# Patient Record
Sex: MALE | Race: WHITE | URBAN - METROPOLITAN AREA
[De-identification: names, ages, dates, MRNs, and addresses within clinical notes are randomized per-mention and may not be internally consistent; named-entity substitution may affect disease eponyms.]

---

## 2017-06-01 ENCOUNTER — OFFICE (OUTPATIENT)
Dept: URBAN - METROPOLITAN AREA CLINIC 45 | Facility: CLINIC | Age: 81
End: 2017-06-01

## 2017-06-01 VITALS
TEMPERATURE: 97.1 F | WEIGHT: 153 LBS | HEART RATE: 61 BPM | SYSTOLIC BLOOD PRESSURE: 130 MMHG | HEIGHT: 65 IN | DIASTOLIC BLOOD PRESSURE: 66 MMHG

## 2017-06-01 DIAGNOSIS — Z85.00 PERSONAL HISTORY COLON CANCER: ICD-10-CM

## 2017-06-01 DIAGNOSIS — D64.9 ANEMIA UNSPECIFIED: ICD-10-CM

## 2017-06-01 DIAGNOSIS — R63.4 UNEXPLAINED WEIGHT LOSS: ICD-10-CM

## 2017-06-01 PROCEDURE — 99204 OFFICE O/P NEW MOD 45 MIN: CPT

## 2017-06-01 NOTE — SERVICEHPINOTES
The patient is seen for evaluation of constipation.    Notes the onset of constipation   1 - 3  year   ago.  Symptoms started   intermittently  .   Currently, the patient evacuates stool   1   times per   day  .    The stools are   hard  .   They are typically   brown   in color.    Associated symptoms include   excessive straining during defecation and weight loss  .  Symptoms are alleviated with   fiber supplements  .  The patient has previously medicated symptoms with   fiber supplements  .   The patient has undergone a colonoscopy   3  years   ago.  Findings on that exam included   anastamosis  .

## 2017-08-22 ENCOUNTER — OFFICE (OUTPATIENT)
Dept: URBAN - METROPOLITAN AREA CLINIC 45 | Facility: CLINIC | Age: 81
End: 2017-08-22

## 2017-08-22 VITALS
WEIGHT: 158 LBS | SYSTOLIC BLOOD PRESSURE: 142 MMHG | DIASTOLIC BLOOD PRESSURE: 78 MMHG | HEIGHT: 65 IN | HEART RATE: 68 BPM | TEMPERATURE: 97.8 F

## 2017-08-22 DIAGNOSIS — Z85.00 PERSONAL HISTORY COLON CANCER: ICD-10-CM

## 2017-08-22 DIAGNOSIS — K43.2 INCISIONAL HERNIA: ICD-10-CM

## 2017-08-22 DIAGNOSIS — K59.01 CONSTIPATION, SLOW TRANSIT: ICD-10-CM

## 2017-08-22 DIAGNOSIS — K44.9 GASTROESOPHAGEAL REFLUX DISEASE WITH HIATAL HERNIA: ICD-10-CM

## 2017-08-22 PROCEDURE — 99214 OFFICE O/P EST MOD 30 MIN: CPT

## 2017-08-22 RX ORDER — ESOMEPRAZOLE MAGNESIUM 40 MG/1
40 CAPSULE, DELAYED RELEASE ORAL
Qty: 30 | Status: COMPLETED
Start: 2017-08-22 | End: 2018-01-24

## 2017-08-22 RX ORDER — METOCLOPRAMIDE 5 MG/1
15 TABLET ORAL
Qty: 90 | Status: COMPLETED
Start: 2017-08-22 | End: 2018-01-24

## 2017-08-22 NOTE — SERVICEHPINOTES
The patient is seen for evaluation of constipation. Notes the onset of constipation 1 - 3 year ago. Symptoms started intermittently. Currently, the patient evacuates stool 1 times per day. The stools are hard. They are typically brown in color. Associated symptoms include excessive straining during defecation and weight loss. Symptoms are alleviated with fiber supplements. The patient has previously medicated symptoms with fiber supplements. The patient has undergone a colonoscopy 3 years ago. Findings on that exam included anastamosis. Negative CT scan and chem panel 2 mo ago

## 2018-01-24 ENCOUNTER — OFFICE (OUTPATIENT)
Dept: URBAN - METROPOLITAN AREA CLINIC 45 | Facility: CLINIC | Age: 82
End: 2018-01-24

## 2018-01-24 VITALS
HEART RATE: 68 BPM | HEIGHT: 65 IN | WEIGHT: 150 LBS | TEMPERATURE: 97.7 F | DIASTOLIC BLOOD PRESSURE: 64 MMHG | SYSTOLIC BLOOD PRESSURE: 112 MMHG

## 2018-01-24 DIAGNOSIS — Z86.010 PERSONAL HISTORY COLON POLYPS: ICD-10-CM

## 2018-01-24 DIAGNOSIS — I10 HYPERTENSION: ICD-10-CM

## 2018-01-24 DIAGNOSIS — Z85.00 PERSONAL HISTORY COLON CANCER: ICD-10-CM

## 2018-01-24 DIAGNOSIS — K43.2 INCISIONAL HERNIA: ICD-10-CM

## 2018-01-24 PROCEDURE — 99214 OFFICE O/P EST MOD 30 MIN: CPT | Performed by: INTERNAL MEDICINE

## 2018-01-24 NOTE — SERVICEHPINOTES
Pt states black stool for approx. 1 week. Has irreg bms with occ constipation. Has hx of anemia on iron but stopped iron a few days ago. Denies any rectal bleeding, abd pain, n/v, fever or chills. Pt has lost a few #s recently he states because of depression. Denies any use of Nsaids or blood thinners. Denies any recent heartburn or dysphagia.   Pt has hx of colon cancer sp colon resection 2003. Last colonoscopy 2014, several adenomatous polyps.

## 2018-02-20 ENCOUNTER — HOSPITAL ENCOUNTER (EMERGENCY)
Dept: HOSPITAL 12 - ER | Age: 83
Discharge: HOME | End: 2018-02-20
Payer: MEDICARE

## 2018-02-20 VITALS — SYSTOLIC BLOOD PRESSURE: 146 MMHG | DIASTOLIC BLOOD PRESSURE: 75 MMHG

## 2018-02-20 VITALS — BODY MASS INDEX: 24.11 KG/M2 | WEIGHT: 150 LBS | HEIGHT: 66 IN
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
## 2018-02-20 DIAGNOSIS — Z88.5: ICD-10-CM
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
## 2018-02-20 DIAGNOSIS — M25.512: ICD-10-CM
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
## 2018-02-20 DIAGNOSIS — S43.102A: Primary | ICD-10-CM
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
## 2018-02-20 LAB
ALP SERPL-CCNC: 56 U/L (ref 50–136)
ALT SERPL W/O P-5'-P-CCNC: 32 U/L (ref 16–63)
AST SERPL-CCNC: 25 U/L (ref 15–37)
BASOPHILS # BLD AUTO: 0.1 K/UL (ref 0–8)
BASOPHILS NFR BLD AUTO: 0.6 % (ref 0–2)
BASOPHILS NFR BLD MANUAL: 1 % (ref 0–2)
BILIRUB DIRECT SERPL-MCNC: 0.3 MG/DL (ref 0–0.2)
BILIRUB SERPL-MCNC: 1.1 MG/DL (ref 0.2–1)
BUN SERPL-MCNC: 18 MG/DL (ref 7–18)
CHLORIDE SERPL-SCNC: 102 MMOL/L (ref 98–107)
CO2 SERPL-SCNC: 21 MMOL/L (ref 21–32)
CREAT SERPL-MCNC: 1 MG/DL (ref 0.6–1.3)
EOSINOPHIL # BLD AUTO: 0 K/UL (ref 0–0.7)
EOSINOPHIL NFR BLD AUTO: 0.4 % (ref 0–7)
GLUCOSE SERPL-MCNC: 82 MG/DL (ref 74–106)
HCT VFR BLD AUTO: 46.3 % (ref 36.7–47.1)
HGB BLD-MCNC: 15.9 G/DL (ref 12.5–16.3)
LYMPHOCYTES # BLD AUTO: 6.8 K/UL (ref 20–40)
LYMPHOCYTES NFR BLD AUTO: 55.4 % (ref 20.5–51.5)
LYMPHOCYTES NFR BLD MANUAL: 50 % (ref 20–40)
MCH RBC QN AUTO: 27.8 UUG (ref 23.8–33.4)
MCHC RBC AUTO-ENTMCNC: 34 G/DL (ref 32.5–36.3)
MCV RBC AUTO: 81 FL (ref 73–96.2)
MONOCYTES # BLD AUTO: 0.7 K/UL (ref 2–10)
MONOCYTES NFR BLD AUTO: 6 % (ref 0–11)
MONOCYTES NFR BLD MANUAL: 6 % (ref 2–10)
NEUTROPHILS # BLD AUTO: 4.6 K/UL (ref 1.8–8.9)
NEUTROPHILS NFR BLD AUTO: 37.6 % (ref 38.5–71.5)
NEUTS SEG NFR BLD MANUAL: 43 % (ref 42–75)
PLATELET # BLD AUTO: 178 K/UL (ref 152–348)
POTASSIUM SERPL-SCNC: 3.8 MMOL/L (ref 3.5–5.1)
RBC # BLD AUTO: 5.72 MIL/UL (ref 4.06–5.63)
WBC # BLD AUTO: 12.3 K/UL (ref 3.6–10.2)
WS STN SPEC: 7.5 G/DL (ref 6.4–8.2)

## 2018-02-20 PROCEDURE — A4663 DIALYSIS BLOOD PRESSURE CUFF: HCPCS

## 2018-02-28 ENCOUNTER — OFFICE (OUTPATIENT)
Dept: URBAN - METROPOLITAN AREA CLINIC 45 | Facility: CLINIC | Age: 82
End: 2018-02-28

## 2018-02-28 VITALS
WEIGHT: 137 LBS | TEMPERATURE: 98.6 F | HEART RATE: 77 BPM | HEIGHT: 65 IN | DIASTOLIC BLOOD PRESSURE: 75 MMHG | SYSTOLIC BLOOD PRESSURE: 139 MMHG

## 2018-02-28 DIAGNOSIS — R63.4 UNEXPLAINED WEIGHT LOSS: ICD-10-CM

## 2018-02-28 DIAGNOSIS — Z85.00 PERSONAL HISTORY COLON CANCER: ICD-10-CM

## 2018-02-28 DIAGNOSIS — K43.2 INCISIONAL HERNIA: ICD-10-CM

## 2018-02-28 DIAGNOSIS — K59.01 CONSTIPATION, SLOW TRANSIT: ICD-10-CM

## 2018-02-28 DIAGNOSIS — I10 HYPERTENSION: ICD-10-CM

## 2018-02-28 DIAGNOSIS — K44.9 GASTROESOPHAGEAL REFLUX DISEASE WITH HIATAL HERNIA: ICD-10-CM

## 2018-02-28 PROCEDURE — 99213 OFFICE O/P EST LOW 20 MIN: CPT | Performed by: INTERNAL MEDICINE

## 2018-02-28 NOTE — SERVICEHPINOTES
Was in ER last night for constipation and lower abd discomfort.    PALENCIA including KUB and CT scan wo evidence for bowel obstruction. Pt has very thi stool and poor appetite . Continues to lose weight. Has hx of colon ca and hx of SBO. Lost 13# sinceJan. Denies any recent blood in stool or black stool.

## 2020-08-03 ENCOUNTER — HOSPITAL ENCOUNTER (INPATIENT)
Dept: HOSPITAL 12 - ER | Age: 85
LOS: 3 days | Discharge: NURSING, REHAB OR LONG-TERM CARE | DRG: 871 | End: 2020-08-06
Payer: MEDICARE

## 2020-08-03 VITALS — HEIGHT: 65 IN | WEIGHT: 147 LBS | BODY MASS INDEX: 24.49 KG/M2

## 2020-08-03 VITALS — SYSTOLIC BLOOD PRESSURE: 112 MMHG | DIASTOLIC BLOOD PRESSURE: 51 MMHG

## 2020-08-03 DIAGNOSIS — F02.80: ICD-10-CM

## 2020-08-03 DIAGNOSIS — R74.0: ICD-10-CM

## 2020-08-03 DIAGNOSIS — G20: ICD-10-CM

## 2020-08-03 DIAGNOSIS — A41.9: Primary | ICD-10-CM

## 2020-08-03 DIAGNOSIS — N17.0: ICD-10-CM

## 2020-08-03 DIAGNOSIS — J98.11: ICD-10-CM

## 2020-08-03 DIAGNOSIS — Z85.038: ICD-10-CM

## 2020-08-03 DIAGNOSIS — M17.0: ICD-10-CM

## 2020-08-03 DIAGNOSIS — B96.5: ICD-10-CM

## 2020-08-03 DIAGNOSIS — N39.0: ICD-10-CM

## 2020-08-03 DIAGNOSIS — Z92.21: ICD-10-CM

## 2020-08-03 LAB
ALP SERPL-CCNC: 96 U/L (ref 50–136)
ALT SERPL W/O P-5'-P-CCNC: 79 U/L (ref 16–63)
AST SERPL-CCNC: 98 U/L (ref 15–37)
BASOPHILS # BLD AUTO: 0 K/UL (ref 0–8)
BASOPHILS NFR BLD AUTO: 0.4 % (ref 0–2)
BILIRUB SERPL-MCNC: 0.7 MG/DL (ref 0.2–1)
BUN SERPL-MCNC: 29 MG/DL (ref 7–18)
CHLORIDE SERPL-SCNC: 106 MMOL/L (ref 98–107)
CK SERPL-CCNC: 375 U/L (ref 39–308)
CO2 SERPL-SCNC: 25 MMOL/L (ref 21–32)
CREAT SERPL-MCNC: 1.2 MG/DL (ref 0.6–1.3)
EOSINOPHIL # BLD AUTO: 0 K/UL (ref 0–0.7)
EOSINOPHIL NFR BLD AUTO: 0.2 % (ref 0–7)
FERRITIN SERPL-MCNC: 557 NG/ML (ref 26–388)
GLUCOSE SERPL-MCNC: 182 MG/DL (ref 74–106)
HCT VFR BLD AUTO: 36.3 % (ref 36.7–47.1)
HGB BLD-MCNC: 12.1 G/DL (ref 12.5–16.3)
LDH SERPL L TO P-CCNC: 235 U/L (ref 85–227)
LYMPHOCYTES # BLD AUTO: 2.5 K/UL (ref 20–40)
LYMPHOCYTES NFR BLD AUTO: 25.1 % (ref 20.5–51.5)
MCH RBC QN AUTO: 27.3 UUG (ref 23.8–33.4)
MCHC RBC AUTO-ENTMCNC: 33 G/DL (ref 32.5–36.3)
MCV RBC AUTO: 82 FL (ref 73–96.2)
MONOCYTES # BLD AUTO: 0.8 K/UL (ref 2–10)
MONOCYTES NFR BLD AUTO: 8.3 % (ref 0–11)
NEUTROPHILS # BLD AUTO: 6.5 K/UL (ref 1.8–8.9)
NEUTROPHILS NFR BLD AUTO: 66 % (ref 38.5–71.5)
PLATELET # BLD AUTO: 110 K/UL (ref 152–348)
POTASSIUM SERPL-SCNC: 4.2 MMOL/L (ref 3.5–5.1)
RBC # BLD AUTO: 4.43 MIL/UL (ref 4.06–5.63)
WBC # BLD AUTO: 9.9 K/UL (ref 3.6–10.2)
WS STN SPEC: 6.7 G/DL (ref 6.4–8.2)

## 2020-08-03 PROCEDURE — C1758 CATHETER, URETERAL: HCPCS

## 2020-08-03 PROCEDURE — G0378 HOSPITAL OBSERVATION PER HR: HCPCS

## 2020-08-03 PROCEDURE — A9150 MISC/EXPER NON-PRESCRIPT DRU: HCPCS

## 2020-08-03 PROCEDURE — A4663 DIALYSIS BLOOD PRESSURE CUFF: HCPCS

## 2020-08-03 RX ADMIN — SODIUM CHLORIDE PRN MLS/HR: 0.9 INJECTION, SOLUTION INTRAVENOUS at 22:30

## 2020-08-03 RX ADMIN — ENOXAPARIN SODIUM SCH MG: 30 INJECTION SUBCUTANEOUS at 23:03

## 2020-08-03 NOTE — NUR
pt bib ra 83 from home co fever. pt speaking farsi only, poor historian. denies 
pain or sob. only co being cold. covid precautions implemented

## 2020-08-03 NOTE — NUR
PT admitted from ER via [facility] under the care of   with the dx of sepsis , pneumonia 
and r/o covid Skilled nursing assessment done.alert  with periods of confusion,able to 
follow simple commands, denies pain , no facial grimaces of discomfort, no s/s of distress 
noted. IV line on right forearm 20 g patent and intact, no s/s of infiltration. Skin issues 
and photos taken .Call light with in reach .Will continue  to monitor.

## 2020-08-03 NOTE — NUR
Pt. admitted to Tele Room 318  , under care of Dr. Cuello

Belongs List completed, All belongings with pt

no s/s of distress

respirations even and unlabored

transported pt via gurney 2 RNs

## 2020-08-04 VITALS — DIASTOLIC BLOOD PRESSURE: 58 MMHG | SYSTOLIC BLOOD PRESSURE: 100 MMHG

## 2020-08-04 VITALS — DIASTOLIC BLOOD PRESSURE: 46 MMHG | SYSTOLIC BLOOD PRESSURE: 105 MMHG

## 2020-08-04 VITALS — SYSTOLIC BLOOD PRESSURE: 109 MMHG | DIASTOLIC BLOOD PRESSURE: 56 MMHG

## 2020-08-04 VITALS — SYSTOLIC BLOOD PRESSURE: 144 MMHG | DIASTOLIC BLOOD PRESSURE: 55 MMHG

## 2020-08-04 VITALS — SYSTOLIC BLOOD PRESSURE: 94 MMHG | DIASTOLIC BLOOD PRESSURE: 52 MMHG

## 2020-08-04 LAB
APPEARANCE UR: (no result)
BASOPHILS # BLD AUTO: 0 K/UL (ref 0–8)
BASOPHILS NFR BLD AUTO: 0.2 % (ref 0–2)
BILIRUB UR QL STRIP: NEGATIVE
BUN SERPL-MCNC: 25 MG/DL (ref 7–18)
CHLORIDE SERPL-SCNC: 107 MMOL/L (ref 98–107)
CHOLEST SERPL-MCNC: 83 MG/DL (ref ?–200)
CO2 SERPL-SCNC: 28 MMOL/L (ref 21–32)
COLOR UR: (no result)
CREAT SERPL-MCNC: 1.1 MG/DL (ref 0.6–1.3)
EOSINOPHIL # BLD AUTO: 0 K/UL (ref 0–0.7)
EOSINOPHIL NFR BLD AUTO: 0.1 % (ref 0–7)
FERRITIN SERPL-MCNC: 545 NG/ML (ref 26–388)
GLUCOSE SERPL-MCNC: 95 MG/DL (ref 74–106)
GLUCOSE UR STRIP-MCNC: NEGATIVE MG/DL
HCT VFR BLD AUTO: 36.6 % (ref 36.7–47.1)
HDLC SERPL-MCNC: 41 MG/DL (ref 40–60)
HGB BLD-MCNC: 12.3 G/DL (ref 12.5–16.3)
HGB UR QL STRIP: (no result)
KETONES UR STRIP-MCNC: NEGATIVE MG/DL
LDH SERPL L TO P-CCNC: 246 U/L (ref 85–227)
LEUKOCYTE ESTERASE UR QL STRIP: (no result)
LYMPHOCYTES # BLD AUTO: 6.9 K/UL (ref 20–40)
LYMPHOCYTES NFR BLD AUTO: 47.4 % (ref 20.5–51.5)
MAGNESIUM SERPL-MCNC: 2 MG/DL (ref 1.8–2.4)
MCH RBC QN AUTO: 27.3 UUG (ref 23.8–33.4)
MCHC RBC AUTO-ENTMCNC: 34 G/DL (ref 32.5–36.3)
MCV RBC AUTO: 81.2 FL (ref 73–96.2)
MONOCYTES # BLD AUTO: 1 K/UL (ref 2–10)
MONOCYTES NFR BLD AUTO: 7 % (ref 0–11)
NEUTROPHILS # BLD AUTO: 6.6 K/UL (ref 1.8–8.9)
NEUTROPHILS NFR BLD AUTO: 45.3 % (ref 38.5–71.5)
NITRITE UR QL STRIP: POSITIVE
PH UR STRIP: 7 [PH] (ref 5–8)
PHOSPHATE SERPL-MCNC: 3.4 MG/DL (ref 2.5–4.9)
PLATELET # BLD AUTO: 107 K/UL (ref 152–348)
POTASSIUM SERPL-SCNC: 3.7 MMOL/L (ref 3.5–5.1)
RBC # BLD AUTO: 4.51 MIL/UL (ref 4.06–5.63)
RBC #/AREA URNS HPF: (no result) /HPF (ref 0–3)
SP GR UR STRIP: 1.02 (ref 1–1.03)
TRIGL SERPL-MCNC: 58 MG/DL (ref 30–150)
TSH SERPL DL<=0.005 MIU/L-ACNC: 0.78 MIU/ML (ref 0.36–3.74)
UROBILINOGEN UR STRIP-MCNC: 1 E.U./DL
WBC # BLD AUTO: 14.7 K/UL (ref 3.6–10.2)
WBC #/AREA URNS HPF: (no result) /HPF
WBC #/AREA URNS HPF: (no result) /HPF (ref 0–3)

## 2020-08-04 RX ADMIN — ZINC SULFATE CAP 220 MG (50 MG ELEMENTAL ZN) SCH MG: 220 (50 ZN) CAP at 08:25

## 2020-08-04 RX ADMIN — DONEPEZIL HYDROCHLORIDE SCH MG: 5 TABLET, FILM COATED ORAL at 20:16

## 2020-08-04 RX ADMIN — OXYCODONE HYDROCHLORIDE AND ACETAMINOPHEN SCH MG: 500 TABLET ORAL at 08:24

## 2020-08-04 RX ADMIN — CITALOPRAM HYDROBROMIDE SCH MG: 20 TABLET, FILM COATED ORAL at 08:23

## 2020-08-04 RX ADMIN — PANTOPRAZOLE SODIUM SCH MG: 40 TABLET, DELAYED RELEASE ORAL at 06:29

## 2020-08-04 RX ADMIN — ATORVASTATIN CALCIUM SCH MG: 10 TABLET, FILM COATED ORAL at 20:16

## 2020-08-04 RX ADMIN — ENOXAPARIN SODIUM SCH MG: 30 INJECTION SUBCUTANEOUS at 20:19

## 2020-08-04 RX ADMIN — QUETIAPINE SCH MG: 25 TABLET, FILM COATED ORAL at 20:16

## 2020-08-04 RX ADMIN — DEXTROSE SCH MLS/HR: 50 INJECTION, SOLUTION INTRAVENOUS at 20:16

## 2020-08-04 RX ADMIN — SODIUM CHLORIDE PRN MLS/HR: 0.9 INJECTION, SOLUTION INTRAVENOUS at 11:45

## 2020-08-04 RX ADMIN — VITAMIN D, TAB 1000IU (100/BT) SCH UNIT: 25 TAB at 08:23

## 2020-08-04 RX ADMIN — LOSARTAN POTASSIUM SCH MG: 50 TABLET, FILM COATED ORAL at 08:25

## 2020-08-04 NOTE — NUR
DR SOUSA HERE TO SEE PATIENT PER PATIENTS WIFE HE IS HAVING PAIN ON HIS LEFT AND RIGHT 
KNEES MD NOTIFIED WITH ORDER TO DO XRAYS OF BOTH KNEES AND NOTED

## 2020-08-04 NOTE — NUR
Patient slept throughout the night, no s/s of distress noted, afebrile, on tele monitor 
sinus rhythm , on IV fluid running at 75 cc/hr ,IV site on left upper arm .Call light within 
reach.Safety measure and isolation implemented.Will endorse to the oncoming shift.

## 2020-08-04 NOTE — NUR
RECEIVED IN BED PATIENT IS ALERT BUT HAS LANGUAGE ISSUES ALL NEEDS ANTICIPATED AND SATISFIED 
REMAIN ON IVF AS ORDERED WITH NO S/S OF INFILTERATION ON SITE.ATTEMPTS ARE BEING MADE TO 
COLLECT URINE AS ORDERED.CALL LIGHTS AND PERSONAL BELONGINGS ARE WITHIN EASY REACH MADE 
COMFORTABLE WILL CONTINUE TO OBSERVE

## 2020-08-04 NOTE — NUR
PER THE CHARGE RN PATIENT WILL BE SEEN BY THE INFECTIOUS DISEASE DR MANUEL WHO WILL THEN MAKE 
THE DECISION IF PATIENT CAN BE OFF ISOLATION

## 2020-08-04 NOTE — NUR
Received pt in bed, awake. Patient has no s/s of acute distress or pain at this time. V/S 
stable, on room air saturating WNL. IV on RFA is intact with NS running at 75cc. Safety 
measures in place. Call light within reach. Bed low and locked in position. Will continue 
with the plan of care.

## 2020-08-05 VITALS — SYSTOLIC BLOOD PRESSURE: 158 MMHG | DIASTOLIC BLOOD PRESSURE: 54 MMHG

## 2020-08-05 VITALS — SYSTOLIC BLOOD PRESSURE: 134 MMHG | DIASTOLIC BLOOD PRESSURE: 52 MMHG

## 2020-08-05 VITALS — SYSTOLIC BLOOD PRESSURE: 112 MMHG | DIASTOLIC BLOOD PRESSURE: 49 MMHG

## 2020-08-05 VITALS — SYSTOLIC BLOOD PRESSURE: 134 MMHG | DIASTOLIC BLOOD PRESSURE: 61 MMHG

## 2020-08-05 VITALS — DIASTOLIC BLOOD PRESSURE: 58 MMHG | SYSTOLIC BLOOD PRESSURE: 115 MMHG

## 2020-08-05 VITALS — SYSTOLIC BLOOD PRESSURE: 137 MMHG | DIASTOLIC BLOOD PRESSURE: 63 MMHG

## 2020-08-05 LAB
BASOPHILS # BLD AUTO: 0 K/UL (ref 0–8)
BASOPHILS NFR BLD AUTO: 0.4 % (ref 0–2)
BUN SERPL-MCNC: 21 MG/DL (ref 7–18)
CHLORIDE SERPL-SCNC: 108 MMOL/L (ref 98–107)
CO2 SERPL-SCNC: 23 MMOL/L (ref 21–32)
CREAT SERPL-MCNC: 0.9 MG/DL (ref 0.6–1.3)
EOSINOPHIL # BLD AUTO: 0.2 K/UL (ref 0–0.7)
EOSINOPHIL NFR BLD AUTO: 1.7 % (ref 0–7)
FERRITIN SERPL-MCNC: 561 NG/ML (ref 26–388)
GLUCOSE SERPL-MCNC: 94 MG/DL (ref 74–106)
HCT VFR BLD AUTO: 32.1 % (ref 36.7–47.1)
HGB BLD-MCNC: 10.9 G/DL (ref 12.5–16.3)
LDH SERPL L TO P-CCNC: 202 U/L (ref 85–227)
LYMPHOCYTES # BLD AUTO: 5.4 K/UL (ref 20–40)
LYMPHOCYTES NFR BLD AUTO: 51.6 % (ref 20.5–51.5)
MAGNESIUM SERPL-MCNC: 1.9 MG/DL (ref 1.8–2.4)
MCH RBC QN AUTO: 27.5 UUG (ref 23.8–33.4)
MCHC RBC AUTO-ENTMCNC: 34 G/DL (ref 32.5–36.3)
MCV RBC AUTO: 81 FL (ref 73–96.2)
MONOCYTES # BLD AUTO: 0.7 K/UL (ref 2–10)
MONOCYTES NFR BLD AUTO: 7 % (ref 0–11)
NEUTROPHILS # BLD AUTO: 4.1 K/UL (ref 1.8–8.9)
NEUTROPHILS NFR BLD AUTO: 39.3 % (ref 38.5–71.5)
PHOSPHATE SERPL-MCNC: 3 MG/DL (ref 2.5–4.9)
PLATELET # BLD AUTO: 111 K/UL (ref 152–348)
POTASSIUM SERPL-SCNC: 3.9 MMOL/L (ref 3.5–5.1)
RBC # BLD AUTO: 3.96 MIL/UL (ref 4.06–5.63)
WBC # BLD AUTO: 10.5 K/UL (ref 3.6–10.2)

## 2020-08-05 RX ADMIN — PANTOPRAZOLE SODIUM SCH MG: 40 TABLET, DELAYED RELEASE ORAL at 06:06

## 2020-08-05 RX ADMIN — DEXTROSE SCH MLS/HR: 50 INJECTION, SOLUTION INTRAVENOUS at 20:24

## 2020-08-05 RX ADMIN — LOSARTAN POTASSIUM SCH MG: 50 TABLET, FILM COATED ORAL at 08:57

## 2020-08-05 RX ADMIN — SODIUM CHLORIDE PRN MLS/HR: 0.9 INJECTION, SOLUTION INTRAVENOUS at 16:45

## 2020-08-05 RX ADMIN — DEXTROSE SCH MLS/HR: 50 INJECTION, SOLUTION INTRAVENOUS at 08:13

## 2020-08-05 RX ADMIN — SODIUM CHLORIDE PRN MLS/HR: 0.9 INJECTION, SOLUTION INTRAVENOUS at 03:07

## 2020-08-05 RX ADMIN — DONEPEZIL HYDROCHLORIDE SCH MG: 5 TABLET, FILM COATED ORAL at 20:24

## 2020-08-05 RX ADMIN — ENOXAPARIN SODIUM SCH MG: 30 INJECTION SUBCUTANEOUS at 21:19

## 2020-08-05 RX ADMIN — VITAMIN D, TAB 1000IU (100/BT) SCH UNIT: 25 TAB at 08:55

## 2020-08-05 RX ADMIN — ZINC SULFATE CAP 220 MG (50 MG ELEMENTAL ZN) SCH MG: 220 (50 ZN) CAP at 08:56

## 2020-08-05 RX ADMIN — QUETIAPINE SCH MG: 25 TABLET, FILM COATED ORAL at 20:28

## 2020-08-05 RX ADMIN — OXYCODONE HYDROCHLORIDE AND ACETAMINOPHEN SCH MG: 500 TABLET ORAL at 08:56

## 2020-08-05 RX ADMIN — CITALOPRAM HYDROBROMIDE SCH MG: 20 TABLET, FILM COATED ORAL at 08:56

## 2020-08-05 RX ADMIN — ATORVASTATIN CALCIUM SCH MG: 10 TABLET, FILM COATED ORAL at 20:24

## 2020-08-05 NOTE — NUR
noted with very yellow to light brown color of urine in catheter bag, PT eval done, patient 
ambulated with PT few steps.

## 2020-08-05 NOTE — NUR
Bladder scan shows 650ml of urine. Initiated insertion of 16 fr. vázquez catheter per MD 
order. Vázquez is patent and draining camryn color urine. Will continue to monitor

## 2020-08-05 NOTE — NUR
patient stayed in bed, PT eval in process, tolerated meals well, no sob, resp even 
nonlabored, no cough or congestion noted, saturating 98% at room air,on tele monitor, sinus 
rythm. skin warm and dry to touch, turned and repositioned every 2 hours, heels elevated. 
vázquez catheter in place, draining brown colored urine, on hydration fluids as ordered, no 
distress noted, continue to monitor,

-------------------------------------------------------------------------------

Addendum: 08/05/20 at 1454 by ROGER BONILLA RN, RN

-------------------------------------------------------------------------------

safety precautions are in place

## 2020-08-05 NOTE — NUR
Pt slept intermittently throughout the night. Patient is awake and has no s/s of acute 
distress or pain at this time.V/S stable on room air. NSR 69 on tele monitor. Patient on 
vázquez, draining well. Fall and aspiration precaution maintained. Comfort care and needs 
attended. Safety measures in place. Call light within reach. Will endorse to the oncoming 
nurse accordingly.

## 2020-08-05 NOTE — NUR
noted right hand with pitting edema +2, elevated as tolerated, will continue to monitor, and 
endorse accordingly, IV site to right forearm is intact, no signs and symptoms of 
infiltration noted. no redness, no swelling noted at IV site.

## 2020-08-06 ENCOUNTER — HOSPITAL ENCOUNTER (INPATIENT)
Dept: HOSPITAL 12 - REHABOV3 | Age: 85
LOS: 17 days | Discharge: NURSING, REHAB OR LONG-TERM CARE | DRG: 871 | End: 2020-08-23
Attending: PHYSICAL MEDICINE & REHABILITATION | Admitting: PHYSICAL MEDICINE & REHABILITATION
Payer: MEDICARE

## 2020-08-06 VITALS — SYSTOLIC BLOOD PRESSURE: 121 MMHG | DIASTOLIC BLOOD PRESSURE: 42 MMHG

## 2020-08-06 VITALS — HEIGHT: 65 IN | WEIGHT: 128.5 LBS | BODY MASS INDEX: 21.41 KG/M2

## 2020-08-06 VITALS — SYSTOLIC BLOOD PRESSURE: 114 MMHG | DIASTOLIC BLOOD PRESSURE: 58 MMHG

## 2020-08-06 VITALS — DIASTOLIC BLOOD PRESSURE: 56 MMHG | SYSTOLIC BLOOD PRESSURE: 122 MMHG

## 2020-08-06 VITALS — SYSTOLIC BLOOD PRESSURE: 135 MMHG | DIASTOLIC BLOOD PRESSURE: 62 MMHG

## 2020-08-06 VITALS — DIASTOLIC BLOOD PRESSURE: 50 MMHG | SYSTOLIC BLOOD PRESSURE: 113 MMHG

## 2020-08-06 DIAGNOSIS — D50.9: ICD-10-CM

## 2020-08-06 DIAGNOSIS — Z88.8: ICD-10-CM

## 2020-08-06 DIAGNOSIS — D68.59: ICD-10-CM

## 2020-08-06 DIAGNOSIS — F02.80: ICD-10-CM

## 2020-08-06 DIAGNOSIS — N13.8: ICD-10-CM

## 2020-08-06 DIAGNOSIS — J18.9: ICD-10-CM

## 2020-08-06 DIAGNOSIS — R53.81: ICD-10-CM

## 2020-08-06 DIAGNOSIS — M17.0: ICD-10-CM

## 2020-08-06 DIAGNOSIS — Z85.038: ICD-10-CM

## 2020-08-06 DIAGNOSIS — G31.83: ICD-10-CM

## 2020-08-06 DIAGNOSIS — G92: ICD-10-CM

## 2020-08-06 DIAGNOSIS — I10: ICD-10-CM

## 2020-08-06 DIAGNOSIS — B96.5: ICD-10-CM

## 2020-08-06 DIAGNOSIS — R53.1: ICD-10-CM

## 2020-08-06 DIAGNOSIS — A41.9: Primary | ICD-10-CM

## 2020-08-06 DIAGNOSIS — F39: ICD-10-CM

## 2020-08-06 DIAGNOSIS — N39.0: ICD-10-CM

## 2020-08-06 DIAGNOSIS — Z90.49: ICD-10-CM

## 2020-08-06 DIAGNOSIS — G47.00: ICD-10-CM

## 2020-08-06 DIAGNOSIS — N40.1: ICD-10-CM

## 2020-08-06 DIAGNOSIS — Z86.73: ICD-10-CM

## 2020-08-06 DIAGNOSIS — N17.0: ICD-10-CM

## 2020-08-06 DIAGNOSIS — Z92.21: ICD-10-CM

## 2020-08-06 DIAGNOSIS — R41.89: ICD-10-CM

## 2020-08-06 DIAGNOSIS — G89.29: ICD-10-CM

## 2020-08-06 DIAGNOSIS — E44.0: ICD-10-CM

## 2020-08-06 DIAGNOSIS — F41.1: ICD-10-CM

## 2020-08-06 DIAGNOSIS — F03.90: ICD-10-CM

## 2020-08-06 DIAGNOSIS — Z88.6: ICD-10-CM

## 2020-08-06 DIAGNOSIS — K21.9: ICD-10-CM

## 2020-08-06 DIAGNOSIS — R33.8: ICD-10-CM

## 2020-08-06 LAB
BASOPHILS # BLD AUTO: 0 K/UL (ref 0–8)
BASOPHILS NFR BLD AUTO: 0.3 % (ref 0–2)
BUN SERPL-MCNC: 18 MG/DL (ref 7–18)
CHLORIDE SERPL-SCNC: 108 MMOL/L (ref 98–107)
CO2 SERPL-SCNC: 26 MMOL/L (ref 21–32)
CREAT SERPL-MCNC: 0.9 MG/DL (ref 0.6–1.3)
EOSINOPHIL # BLD AUTO: 0.3 K/UL (ref 0–0.7)
EOSINOPHIL NFR BLD AUTO: 2.5 % (ref 0–7)
FERRITIN SERPL-MCNC: 581 NG/ML (ref 26–388)
GLUCOSE SERPL-MCNC: 93 MG/DL (ref 74–106)
HCT VFR BLD AUTO: 34.1 % (ref 36.7–47.1)
HGB BLD-MCNC: 11.5 G/DL (ref 12.5–16.3)
LYMPHOCYTES # BLD AUTO: 5.8 K/UL (ref 20–40)
LYMPHOCYTES NFR BLD AUTO: 53.6 % (ref 20.5–51.5)
MAGNESIUM SERPL-MCNC: 1.9 MG/DL (ref 1.8–2.4)
MCH RBC QN AUTO: 27.5 UUG (ref 23.8–33.4)
MCHC RBC AUTO-ENTMCNC: 34 G/DL (ref 32.5–36.3)
MCV RBC AUTO: 81.5 FL (ref 73–96.2)
MONOCYTES # BLD AUTO: 0.8 K/UL (ref 2–10)
MONOCYTES NFR BLD AUTO: 7.1 % (ref 0–11)
NEUTROPHILS # BLD AUTO: 3.9 K/UL (ref 1.8–8.9)
NEUTROPHILS NFR BLD AUTO: 36.5 % (ref 38.5–71.5)
PHOSPHATE SERPL-MCNC: 3.3 MG/DL (ref 2.5–4.9)
PLATELET # BLD AUTO: 134 K/UL (ref 152–348)
POTASSIUM SERPL-SCNC: 4 MMOL/L (ref 3.5–5.1)
RBC # BLD AUTO: 4.18 MIL/UL (ref 4.06–5.63)
WBC # BLD AUTO: 10.8 K/UL (ref 3.6–10.2)

## 2020-08-06 PROCEDURE — A4217 STERILE WATER/SALINE, 500 ML: HCPCS

## 2020-08-06 PROCEDURE — A4663 DIALYSIS BLOOD PRESSURE CUFF: HCPCS

## 2020-08-06 PROCEDURE — C1758 CATHETER, URETERAL: HCPCS

## 2020-08-06 RX ADMIN — TAMSULOSIN HYDROCHLORIDE SCH MG: 0.4 CAPSULE ORAL at 20:11

## 2020-08-06 RX ADMIN — LOSARTAN POTASSIUM SCH MG: 50 TABLET, FILM COATED ORAL at 08:25

## 2020-08-06 RX ADMIN — ATORVASTATIN CALCIUM SCH MG: 10 TABLET, FILM COATED ORAL at 20:11

## 2020-08-06 RX ADMIN — PANTOPRAZOLE SODIUM SCH MG: 40 TABLET, DELAYED RELEASE ORAL at 06:07

## 2020-08-06 RX ADMIN — CITALOPRAM HYDROBROMIDE SCH MG: 20 TABLET, FILM COATED ORAL at 09:16

## 2020-08-06 RX ADMIN — DEXTROSE SCH MLS/HR: 50 INJECTION, SOLUTION INTRAVENOUS at 08:24

## 2020-08-06 RX ADMIN — ZINC SULFATE CAP 220 MG (50 MG ELEMENTAL ZN) SCH MG: 220 (50 ZN) CAP at 09:15

## 2020-08-06 RX ADMIN — HYDROCODONE BITARTRATE AND ACETAMINOPHEN PRN TAB: 5; 325 TABLET ORAL at 20:15

## 2020-08-06 RX ADMIN — OXYCODONE HYDROCHLORIDE AND ACETAMINOPHEN SCH MG: 500 TABLET ORAL at 08:24

## 2020-08-06 RX ADMIN — QUETIAPINE SCH MG: 25 TABLET, FILM COATED ORAL at 20:14

## 2020-08-06 RX ADMIN — VITAMIN D, TAB 1000IU (100/BT) SCH UNIT: 25 TAB at 09:15

## 2020-08-06 RX ADMIN — DONEPEZIL HYDROCHLORIDE SCH MG: 5 TABLET, FILM COATED ORAL at 20:11

## 2020-08-06 NOTE — NUR
PATIENT ALERT BUT FORGETFUL NO SOB NO CHEST PAIN, PATIENT ON TELE MONITOR SINUS RHYTHM SINUS 
KERMIT DEO 50'S, NO CATH PATENT DRAINING WITH YELLLOW COLOR URINE IN MODERATE AMOUNT, 
PATIENT HAS NO S/S OF PAIN AT THIS TIME, R ARM CONTINUE TO ELEVATE DUE SWELLING, PITTING 
EDEMA. CONT TO MONITOR.

## 2020-08-06 NOTE — NUR
Received pt resting in bed and talking on the phone with family. No acute distress noted. 
C/o 6/10 pain on bilateral knees and legs, PRN pain med and other due meds given as ordered. 
Meds given crushed with pudding, tolerated well. Snacks given, with assist feeding and 
drinking. Turned and repositioned. Both heels offloaded. Safety measures maintained. Call 
light and personal items within reach. Will continue to monitor.

## 2020-08-07 VITALS — DIASTOLIC BLOOD PRESSURE: 58 MMHG | SYSTOLIC BLOOD PRESSURE: 121 MMHG

## 2020-08-07 VITALS — SYSTOLIC BLOOD PRESSURE: 117 MMHG | DIASTOLIC BLOOD PRESSURE: 56 MMHG

## 2020-08-07 VITALS — DIASTOLIC BLOOD PRESSURE: 57 MMHG | SYSTOLIC BLOOD PRESSURE: 131 MMHG

## 2020-08-07 VITALS — DIASTOLIC BLOOD PRESSURE: 71 MMHG | SYSTOLIC BLOOD PRESSURE: 129 MMHG

## 2020-08-07 RX ADMIN — DONEPEZIL HYDROCHLORIDE SCH MG: 5 TABLET, FILM COATED ORAL at 20:08

## 2020-08-07 RX ADMIN — LOSARTAN POTASSIUM SCH MG: 50 TABLET, FILM COATED ORAL at 09:20

## 2020-08-07 RX ADMIN — CARBIDOPA AND LEVODOPA SCH TAB: 25; 100 TABLET ORAL at 20:09

## 2020-08-07 RX ADMIN — TAMSULOSIN HYDROCHLORIDE SCH MG: 0.4 CAPSULE ORAL at 20:09

## 2020-08-07 RX ADMIN — ANALGESIC BALM SCH GM: 1.74; 4.06 OINTMENT TOPICAL at 22:00

## 2020-08-07 RX ADMIN — CITALOPRAM HYDROBROMIDE SCH MG: 20 TABLET, FILM COATED ORAL at 09:20

## 2020-08-07 RX ADMIN — ANALGESIC BALM SCH GM: 1.74; 4.06 OINTMENT TOPICAL at 20:00

## 2020-08-07 RX ADMIN — QUETIAPINE SCH MG: 25 TABLET, FILM COATED ORAL at 20:09

## 2020-08-07 RX ADMIN — PANTOPRAZOLE SODIUM SCH MG: 40 TABLET, DELAYED RELEASE ORAL at 06:13

## 2020-08-07 RX ADMIN — ATORVASTATIN CALCIUM SCH MG: 10 TABLET, FILM COATED ORAL at 20:08

## 2020-08-07 RX ADMIN — CARBIDOPA AND LEVODOPA SCH TAB: 25; 100 TABLET ORAL at 17:49

## 2020-08-07 NOTE — NUR
Received pt resting in bed. No acute distress noted. Denies pain/ discomfort. Due meds given 
as ordered. Meds given crushed with pudding, tolerated well. Snacks given, with assist 
feeding and drinking. Turned and repositioned. Both heels offloaded. Canada catheter in 
place, draining clear yellow colored urine. Safety measures maintained. Call light and 
personal items within reach. Will continue to monitor.

## 2020-08-07 NOTE — NUR
Per Dr. Galindo refer patient to neurology for hand tremors and was patient seen by Dr. Fox and MD put in new orders.

## 2020-08-07 NOTE — NUR
Patient is awake, alert, not in any form of distress, on room air. He denies any pain or 
discomfort at this time. Assisted patient with his feeding. Due medications administered 
crushed and tolerated well. Needs attended to promptly and met. Peripheral IV site 20 on the 
right wrist in place and patent with no signs of infection. Canada catheter intact, patent, 
draining clear yellow urine. Call light and frequently used items placed within patient's 
reach. Safety measures maintained.

## 2020-08-08 VITALS — DIASTOLIC BLOOD PRESSURE: 67 MMHG | SYSTOLIC BLOOD PRESSURE: 150 MMHG

## 2020-08-08 VITALS — DIASTOLIC BLOOD PRESSURE: 70 MMHG | SYSTOLIC BLOOD PRESSURE: 142 MMHG

## 2020-08-08 VITALS — SYSTOLIC BLOOD PRESSURE: 122 MMHG | DIASTOLIC BLOOD PRESSURE: 52 MMHG

## 2020-08-08 VITALS — SYSTOLIC BLOOD PRESSURE: 130 MMHG | DIASTOLIC BLOOD PRESSURE: 68 MMHG

## 2020-08-08 LAB
BASOPHILS # BLD AUTO: 0 K/UL (ref 0–8)
BASOPHILS NFR BLD AUTO: 0.3 % (ref 0–2)
BUN SERPL-MCNC: 22 MG/DL (ref 7–18)
CHLORIDE SERPL-SCNC: 106 MMOL/L (ref 98–107)
CO2 SERPL-SCNC: 26 MMOL/L (ref 21–32)
CREAT SERPL-MCNC: 0.8 MG/DL (ref 0.6–1.3)
EOSINOPHIL # BLD AUTO: 0.1 K/UL (ref 0–0.7)
EOSINOPHIL NFR BLD AUTO: 1 % (ref 0–7)
GLUCOSE SERPL-MCNC: 114 MG/DL (ref 74–106)
HCT VFR BLD AUTO: 32.4 % (ref 36.7–47.1)
HGB BLD-MCNC: 11 G/DL (ref 12.5–16.3)
LYMPHOCYTES # BLD AUTO: 4.9 K/UL (ref 20–40)
LYMPHOCYTES NFR BLD AUTO: 42 % (ref 20.5–51.5)
MCH RBC QN AUTO: 27.4 UUG (ref 23.8–33.4)
MCHC RBC AUTO-ENTMCNC: 34 G/DL (ref 32.5–36.3)
MCV RBC AUTO: 80.9 FL (ref 73–96.2)
MONOCYTES # BLD AUTO: 1.1 K/UL (ref 2–10)
MONOCYTES NFR BLD AUTO: 9.4 % (ref 0–11)
NEUTROPHILS # BLD AUTO: 5.5 K/UL (ref 1.8–8.9)
NEUTROPHILS NFR BLD AUTO: 47.3 % (ref 38.5–71.5)
PLATELET # BLD AUTO: 170 K/UL (ref 152–348)
POTASSIUM SERPL-SCNC: 4.1 MMOL/L (ref 3.5–5.1)
RBC # BLD AUTO: 4 MIL/UL (ref 4.06–5.63)
WBC # BLD AUTO: 11.7 K/UL (ref 3.6–10.2)

## 2020-08-08 RX ADMIN — CARBIDOPA AND LEVODOPA SCH TAB: 25; 100 TABLET ORAL at 08:52

## 2020-08-08 RX ADMIN — CARBIDOPA AND LEVODOPA SCH TAB: 25; 100 TABLET ORAL at 17:34

## 2020-08-08 RX ADMIN — CITALOPRAM HYDROBROMIDE SCH MG: 20 TABLET, FILM COATED ORAL at 08:52

## 2020-08-08 RX ADMIN — MAGNESIUM HYDROXIDE PRN ML: 400 SUSPENSION ORAL at 14:51

## 2020-08-08 RX ADMIN — HYDROCODONE BITARTRATE AND ACETAMINOPHEN PRN TAB: 5; 325 TABLET ORAL at 04:43

## 2020-08-08 RX ADMIN — PANTOPRAZOLE SODIUM SCH MG: 40 TABLET, DELAYED RELEASE ORAL at 06:04

## 2020-08-08 RX ADMIN — ANALGESIC BALM SCH GM: 1.74; 4.06 OINTMENT TOPICAL at 13:10

## 2020-08-08 RX ADMIN — ATORVASTATIN CALCIUM SCH MG: 10 TABLET, FILM COATED ORAL at 20:52

## 2020-08-08 RX ADMIN — TAMSULOSIN HYDROCHLORIDE SCH MG: 0.4 CAPSULE ORAL at 20:53

## 2020-08-08 RX ADMIN — CARBIDOPA AND LEVODOPA SCH TAB: 25; 100 TABLET ORAL at 13:09

## 2020-08-08 RX ADMIN — DONEPEZIL HYDROCHLORIDE SCH MG: 5 TABLET, FILM COATED ORAL at 20:52

## 2020-08-08 RX ADMIN — CARBIDOPA AND LEVODOPA SCH TAB: 25; 100 TABLET ORAL at 20:53

## 2020-08-08 RX ADMIN — LOSARTAN POTASSIUM SCH MG: 50 TABLET, FILM COATED ORAL at 08:52

## 2020-08-08 RX ADMIN — ANALGESIC BALM SCH GM: 1.74; 4.06 OINTMENT TOPICAL at 21:04

## 2020-08-08 RX ADMIN — ANALGESIC BALM SCH GM: 1.74; 4.06 OINTMENT TOPICAL at 05:43

## 2020-08-08 RX ADMIN — QUETIAPINE SCH MG: 25 TABLET, FILM COATED ORAL at 20:53

## 2020-08-08 RX ADMIN — HYDROCODONE BITARTRATE AND ACETAMINOPHEN PRN TAB: 5; 325 TABLET ORAL at 09:18

## 2020-08-08 NOTE — NUR
Canada catheter removed, pt tolerated well. Pt's temperature 99.6 F, cooling measures 
provided, given bed bath. Norco PRN given for pain. Will endorse accordingly to oncoming 
shift. Continue to monitor.

## 2020-08-08 NOTE — NUR
Received patient awake in bed in stable condition. Patient seen and examined by MD Galindo. 
Patient left knee joint fluid remove by MD Galindo with 12ml yellowish output-sent to lab 
for analysis and culture. Awaiting result. Patient discontinue vázquez catheter as ordered, 
catheter remove at 6am as per endorse. Patient continue ATB levaquin 500mg every 24hours for 
UTI. Patient no urine output until 1pm. Bladder scan- 362ml, straight catheter done with 
620ml urine, yellowish with red color output. no foul smell noted. will continue monitor

## 2020-08-08 NOTE — NUR
Received patient in bed, head of bed elevated at 45 degrees. In no respiratory distress. No 
facial grimacing observed. Patient still with no urinary output since previous shift. 
Bladder scan done- 377mL. Straight catheter done per MD order, obtained 400mL of urine, 
camryn with few blood clots. Patient tolerated procedure well. Will continue to observe 
patient and monitor urinary output. Will continue to observe fall and safety precautions. 
Will continue to attend and anticipate needs.

## 2020-08-09 VITALS — SYSTOLIC BLOOD PRESSURE: 125 MMHG | DIASTOLIC BLOOD PRESSURE: 68 MMHG

## 2020-08-09 VITALS — DIASTOLIC BLOOD PRESSURE: 77 MMHG | SYSTOLIC BLOOD PRESSURE: 130 MMHG

## 2020-08-09 VITALS — DIASTOLIC BLOOD PRESSURE: 71 MMHG | SYSTOLIC BLOOD PRESSURE: 136 MMHG

## 2020-08-09 VITALS — DIASTOLIC BLOOD PRESSURE: 79 MMHG | SYSTOLIC BLOOD PRESSURE: 129 MMHG

## 2020-08-09 RX ADMIN — HYDROCODONE BITARTRATE AND ACETAMINOPHEN PRN TAB: 5; 325 TABLET ORAL at 21:03

## 2020-08-09 RX ADMIN — QUETIAPINE SCH MG: 25 TABLET, FILM COATED ORAL at 21:02

## 2020-08-09 RX ADMIN — AMANTADINE HYDROCHLORIDE SCH MG: 100 CAPSULE, GELATIN COATED ORAL at 10:20

## 2020-08-09 RX ADMIN — ANALGESIC BALM SCH GM: 1.74; 4.06 OINTMENT TOPICAL at 21:02

## 2020-08-09 RX ADMIN — LOSARTAN POTASSIUM SCH MG: 50 TABLET, FILM COATED ORAL at 09:09

## 2020-08-09 RX ADMIN — DONEPEZIL HYDROCHLORIDE SCH MG: 5 TABLET, FILM COATED ORAL at 21:01

## 2020-08-09 RX ADMIN — HYDROCODONE BITARTRATE AND ACETAMINOPHEN PRN TAB: 5; 325 TABLET ORAL at 09:11

## 2020-08-09 RX ADMIN — CARBIDOPA AND LEVODOPA SCH TAB: 25; 100 TABLET ORAL at 21:01

## 2020-08-09 RX ADMIN — ATORVASTATIN CALCIUM SCH MG: 10 TABLET, FILM COATED ORAL at 21:02

## 2020-08-09 RX ADMIN — PANTOPRAZOLE SODIUM SCH MG: 40 TABLET, DELAYED RELEASE ORAL at 06:27

## 2020-08-09 RX ADMIN — MAGNESIUM HYDROXIDE PRN ML: 400 SUSPENSION ORAL at 17:48

## 2020-08-09 RX ADMIN — AMANTADINE HYDROCHLORIDE SCH MG: 100 CAPSULE, GELATIN COATED ORAL at 21:02

## 2020-08-09 RX ADMIN — CARBIDOPA AND LEVODOPA SCH TAB: 25; 100 TABLET ORAL at 12:14

## 2020-08-09 RX ADMIN — ANALGESIC BALM SCH GM: 1.74; 4.06 OINTMENT TOPICAL at 14:36

## 2020-08-09 RX ADMIN — ANALGESIC BALM SCH GM: 1.74; 4.06 OINTMENT TOPICAL at 06:28

## 2020-08-09 RX ADMIN — CARBIDOPA AND LEVODOPA SCH TAB: 25; 100 TABLET ORAL at 09:08

## 2020-08-09 RX ADMIN — CARBIDOPA AND LEVODOPA SCH TAB: 25; 100 TABLET ORAL at 17:06

## 2020-08-09 NOTE — NUR
Patient noted with no BM, milk of magnesium administered as ordered, continue to monitor, 
will endorse accordingly

## 2020-08-09 NOTE — NUR
RECEIVED PT IN NO ACUTE DISTRESS WITH HEAD OF BED ELEVATED.  SAFETY AND COMFORT PROVIDED. 
WILL CONTINUE TO MONITOR.

## 2020-08-09 NOTE — NUR
Patient still with no urinary output. Bladder scan done- 384mL. Straight catheter done per 
MD order, obtained 610mL of urine, camryn with few blood clots. Patient tolerated procedure. 
Will endorse to AM nurse accordingly. All needs attended. Fall and safety precautions 
observed.

## 2020-08-09 NOTE — NUR
HANDS OFF REPORT TO VALENTINA TIMMONS. PT IN NO ACUTE DISTRESS. D/C BLADDER SCAN BECAUSE PT HAS 
NO CATHETER INSERTED. ORDERED BY ADIS GARRIDO. SAFETY AND COMFORT PROVIDED. PT IN STABLE 
CONDITION.

## 2020-08-09 NOTE — NUR
no distress noted during shift, PT, OT tolerated well, kept clean and dry, heels floated, 
repositioned every 2 hours while in bed to relieve the pressure.

## 2020-08-09 NOTE — NUR
Fluid analysis result of left knee aspiration relayed to Dr. Galindo with no new order. Also 
made aware that culture of fluid is still pending.

## 2020-08-10 VITALS — DIASTOLIC BLOOD PRESSURE: 68 MMHG | SYSTOLIC BLOOD PRESSURE: 134 MMHG

## 2020-08-10 VITALS — SYSTOLIC BLOOD PRESSURE: 135 MMHG | DIASTOLIC BLOOD PRESSURE: 71 MMHG

## 2020-08-10 VITALS — DIASTOLIC BLOOD PRESSURE: 68 MMHG | SYSTOLIC BLOOD PRESSURE: 132 MMHG

## 2020-08-10 VITALS — DIASTOLIC BLOOD PRESSURE: 55 MMHG | SYSTOLIC BLOOD PRESSURE: 124 MMHG

## 2020-08-10 VITALS — SYSTOLIC BLOOD PRESSURE: 129 MMHG | DIASTOLIC BLOOD PRESSURE: 58 MMHG

## 2020-08-10 RX ADMIN — ATORVASTATIN CALCIUM SCH MG: 10 TABLET, FILM COATED ORAL at 20:33

## 2020-08-10 RX ADMIN — AMANTADINE HYDROCHLORIDE SCH MG: 100 CAPSULE, GELATIN COATED ORAL at 20:33

## 2020-08-10 RX ADMIN — CARBIDOPA AND LEVODOPA SCH TAB: 25; 100 TABLET ORAL at 13:09

## 2020-08-10 RX ADMIN — LOSARTAN POTASSIUM SCH MG: 50 TABLET, FILM COATED ORAL at 08:24

## 2020-08-10 RX ADMIN — PANTOPRAZOLE SODIUM SCH MG: 40 TABLET, DELAYED RELEASE ORAL at 06:09

## 2020-08-10 RX ADMIN — ANALGESIC BALM SCH GM: 1.74; 4.06 OINTMENT TOPICAL at 23:07

## 2020-08-10 RX ADMIN — AMANTADINE HYDROCHLORIDE SCH MG: 100 CAPSULE, GELATIN COATED ORAL at 08:24

## 2020-08-10 RX ADMIN — CARBIDOPA AND LEVODOPA SCH TAB: 25; 100 TABLET ORAL at 08:24

## 2020-08-10 RX ADMIN — ANALGESIC BALM SCH GM: 1.74; 4.06 OINTMENT TOPICAL at 06:09

## 2020-08-10 RX ADMIN — CARBIDOPA AND LEVODOPA SCH TAB: 25; 100 TABLET ORAL at 17:08

## 2020-08-10 RX ADMIN — ANALGESIC BALM SCH GM: 1.74; 4.06 OINTMENT TOPICAL at 14:03

## 2020-08-10 RX ADMIN — QUETIAPINE SCH MG: 25 TABLET, FILM COATED ORAL at 20:33

## 2020-08-10 RX ADMIN — TAMSULOSIN HYDROCHLORIDE SCH MG: 0.4 CAPSULE ORAL at 20:33

## 2020-08-10 RX ADMIN — DONEPEZIL HYDROCHLORIDE SCH MG: 5 TABLET, FILM COATED ORAL at 20:37

## 2020-08-10 RX ADMIN — HYDROCODONE BITARTRATE AND ACETAMINOPHEN PRN TAB: 5; 325 TABLET ORAL at 09:00

## 2020-08-10 NOTE — NUR
PATIENT ALERT BUT FORGETFUL, NO S/S OF PAIN NOR DISCOMFORT. PATIENT NO CATH PATENT 
DRAINING WITH TEA COLOR URINE IN SMALL AMOUNT. PATIENT WAS KEPT CLEAN AND DRY, CALL LIGHT 
WITHIN REACH. CONT TO MONITOR.

## 2020-08-10 NOTE — NUR
Received patient awake in bed in stable condition. Patient continue with vázquez catheter for 
urine retention. Intact and patent, Iliana color and cloudy urine, no foul smell noted. 
Patient left knee swelling. Applied cold compress. Patient continue pain management prior to 
therapy with good effect. Patient continue monitoring by neurologist for tremors. Patient 
continue sinemet, no adverse reaction noted. will continue monitor

## 2020-08-10 NOTE — NUR
PATIENT AWAKE BUT FORGETFUL NO SOB NO CHEST PAIN. PATIENT HAS NO COMPLAIN OF PAIN AT THIS 
TIME. PATIENT NO CATH PATENT DRAINING WITH TEA COLOR URINE IN MODERATE AMOUNT. MD WAS 
AWARE OF TEA COLOR URINE WITH NO FURTHER ORDER. PATIENT KEPT CLEAN AND DRY, CONT TO MONITOR.

## 2020-08-10 NOTE — NUR
Patient seen and examined by MD Fox, order increase dose of sinemet 25-100mg in morning. 
will continue monitor

## 2020-08-10 NOTE — NUR
Received patient awake in bed. Patient continue on vázquez catheter with yellow color urine. 
Intact and patent. Patient continue therapy for increase strenght and therapeutic exercises. 
Patient continue pain management prior to therapy with good effect. Patient continue 
following by neurologist for tremors. Patient ongoing sinemet with fair effect. will 
continue monitor

## 2020-08-11 VITALS — SYSTOLIC BLOOD PRESSURE: 130 MMHG | DIASTOLIC BLOOD PRESSURE: 64 MMHG

## 2020-08-11 VITALS — DIASTOLIC BLOOD PRESSURE: 85 MMHG | SYSTOLIC BLOOD PRESSURE: 117 MMHG

## 2020-08-11 VITALS — DIASTOLIC BLOOD PRESSURE: 79 MMHG | SYSTOLIC BLOOD PRESSURE: 147 MMHG

## 2020-08-11 VITALS — DIASTOLIC BLOOD PRESSURE: 96 MMHG | SYSTOLIC BLOOD PRESSURE: 133 MMHG

## 2020-08-11 RX ADMIN — PANTOPRAZOLE SODIUM SCH MG: 40 TABLET, DELAYED RELEASE ORAL at 06:36

## 2020-08-11 RX ADMIN — ANALGESIC BALM SCH GM: 1.74; 4.06 OINTMENT TOPICAL at 05:03

## 2020-08-11 RX ADMIN — QUETIAPINE SCH MG: 25 TABLET, FILM COATED ORAL at 20:39

## 2020-08-11 RX ADMIN — HYDROCODONE BITARTRATE AND ACETAMINOPHEN PRN TAB: 5; 325 TABLET ORAL at 16:14

## 2020-08-11 RX ADMIN — CARBIDOPA AND LEVODOPA SCH TAB: 25; 100 TABLET ORAL at 10:06

## 2020-08-11 RX ADMIN — DONEPEZIL HYDROCHLORIDE SCH MG: 5 TABLET, FILM COATED ORAL at 20:39

## 2020-08-11 RX ADMIN — CARBIDOPA AND LEVODOPA SCH TAB: 25; 100 TABLET ORAL at 14:08

## 2020-08-11 RX ADMIN — LOSARTAN POTASSIUM SCH MG: 50 TABLET, FILM COATED ORAL at 08:19

## 2020-08-11 RX ADMIN — ANALGESIC BALM SCH GM: 1.74; 4.06 OINTMENT TOPICAL at 21:08

## 2020-08-11 RX ADMIN — AMANTADINE HYDROCHLORIDE SCH MG: 100 CAPSULE, GELATIN COATED ORAL at 20:39

## 2020-08-11 RX ADMIN — HYDROCODONE BITARTRATE AND ACETAMINOPHEN PRN TAB: 5; 325 TABLET ORAL at 08:48

## 2020-08-11 RX ADMIN — TAMSULOSIN HYDROCHLORIDE SCH MG: 0.4 CAPSULE ORAL at 20:39

## 2020-08-11 RX ADMIN — ANALGESIC BALM SCH GM: 1.74; 4.06 OINTMENT TOPICAL at 14:08

## 2020-08-11 RX ADMIN — CARBIDOPA AND LEVODOPA SCH TAB: 25; 100 TABLET ORAL at 17:52

## 2020-08-11 RX ADMIN — AMANTADINE HYDROCHLORIDE SCH MG: 100 CAPSULE, GELATIN COATED ORAL at 08:19

## 2020-08-11 NOTE — NUR
Received patient awake in bed in stable condition. Patient continue assisting in feeding for 
aspiration precaution. Patient continue therapy for increase participation in ADL and 
therapeutic exercises. Patient provided pain management prior to therapy. tolerated well. 
Patient continue Canada catheter for increase urinary retention. Intact and patent. not in 
distress. will continue monitor

## 2020-08-11 NOTE — NUR
PATIENT ASLEEP BUT EASILY AROUSABLE, NO COMPLAIN OF PAIN AT THIS TIME. PATIENT NO CATH 
PATENT DRAINING WITH YELLOW TO TEA COLOR URINE, TURN AND REPOSITION, KEPT COMFORTABLE. CONT 
TO MONITOR.

## 2020-08-11 NOTE — NUR
Patient seen and examined by MD Galindo, ketanol injection given for pain management by MD. 
Also, ordered MRI of brain without contrast for R/O CVA. Patient will transport to [facility] around 4pm. Patient seen and examined by MD Fox with order increase sinemet 
25-100mg 2 tabs at 10am, to start tomorrow. will continue monitor

## 2020-08-12 VITALS — DIASTOLIC BLOOD PRESSURE: 55 MMHG | SYSTOLIC BLOOD PRESSURE: 115 MMHG

## 2020-08-12 VITALS — SYSTOLIC BLOOD PRESSURE: 140 MMHG | DIASTOLIC BLOOD PRESSURE: 70 MMHG

## 2020-08-12 VITALS — SYSTOLIC BLOOD PRESSURE: 125 MMHG | DIASTOLIC BLOOD PRESSURE: 68 MMHG

## 2020-08-12 VITALS — DIASTOLIC BLOOD PRESSURE: 79 MMHG | SYSTOLIC BLOOD PRESSURE: 142 MMHG

## 2020-08-12 RX ADMIN — AMANTADINE HYDROCHLORIDE SCH MG: 100 CAPSULE, GELATIN COATED ORAL at 20:54

## 2020-08-12 RX ADMIN — CARBIDOPA AND LEVODOPA SCH TAB: 25; 100 TABLET ORAL at 18:18

## 2020-08-12 RX ADMIN — CARBIDOPA AND LEVODOPA SCH TAB: 25; 100 TABLET ORAL at 09:18

## 2020-08-12 RX ADMIN — PANTOPRAZOLE SODIUM SCH MG: 40 TABLET, DELAYED RELEASE ORAL at 06:09

## 2020-08-12 RX ADMIN — LOSARTAN POTASSIUM SCH MG: 50 TABLET, FILM COATED ORAL at 09:14

## 2020-08-12 RX ADMIN — AMANTADINE HYDROCHLORIDE SCH MG: 100 CAPSULE, GELATIN COATED ORAL at 09:14

## 2020-08-12 RX ADMIN — CARBIDOPA AND LEVODOPA SCH TAB: 25; 100 TABLET ORAL at 14:10

## 2020-08-12 RX ADMIN — TAMSULOSIN HYDROCHLORIDE SCH MG: 0.4 CAPSULE ORAL at 20:52

## 2020-08-12 RX ADMIN — DONEPEZIL HYDROCHLORIDE SCH MG: 5 TABLET, FILM COATED ORAL at 20:51

## 2020-08-12 RX ADMIN — QUETIAPINE SCH MG: 25 TABLET, FILM COATED ORAL at 20:52

## 2020-08-12 RX ADMIN — ANALGESIC BALM SCH GM: 1.74; 4.06 OINTMENT TOPICAL at 22:28

## 2020-08-12 RX ADMIN — ANALGESIC BALM SCH GM: 1.74; 4.06 OINTMENT TOPICAL at 16:05

## 2020-08-12 RX ADMIN — ANALGESIC BALM SCH GM: 1.74; 4.06 OINTMENT TOPICAL at 05:32

## 2020-08-12 RX ADMIN — CARBIDOPA AND LEVODOPA SCH TAB: 25; 100 TABLET ORAL at 05:31

## 2020-08-12 NOTE — NUR
Sleeping during initial rounds. No s/s of pain or discomforts noted. No s/s of respiratory 
distress. Safety measures and fall prevention maintained. Continue care as planned.

## 2020-08-12 NOTE — NUR
awake alert and oriented x2-3 . Farsi speaking but able to make needs known.

Tolerated po meds well. No nausea or vomiting noted. Denies any pain nor any

discomfort. VSS. PM care done. Canada catheter intact draining camryn urine. BM

noted this shift. Kept comfortable. Fall precautions maintained. Siderails up for

safety. Will monitor patient.

## 2020-08-12 NOTE — NUR
End of shift note: Slept well most of the shift. No acute distress

noted. Kept comfortable. No acute distress noted. Will monitor

patient. VSS.Canada catheter intact draining camryn urine.

## 2020-08-13 VITALS — DIASTOLIC BLOOD PRESSURE: 68 MMHG | SYSTOLIC BLOOD PRESSURE: 140 MMHG

## 2020-08-13 VITALS — SYSTOLIC BLOOD PRESSURE: 137 MMHG | DIASTOLIC BLOOD PRESSURE: 76 MMHG

## 2020-08-13 VITALS — SYSTOLIC BLOOD PRESSURE: 149 MMHG | DIASTOLIC BLOOD PRESSURE: 61 MMHG

## 2020-08-13 VITALS — SYSTOLIC BLOOD PRESSURE: 109 MMHG | DIASTOLIC BLOOD PRESSURE: 89 MMHG

## 2020-08-13 LAB
ALP SERPL-CCNC: 117 U/L (ref 50–136)
ALT SERPL W/O P-5'-P-CCNC: 57 U/L (ref 16–63)
APPEARANCE UR: CLEAR
AST SERPL-CCNC: 54 U/L (ref 15–37)
BASOPHILS # BLD AUTO: 0 K/UL (ref 0–8)
BASOPHILS NFR BLD AUTO: 0.1 % (ref 0–2)
BILIRUB SERPL-MCNC: 0.3 MG/DL (ref 0.2–1)
BILIRUB UR QL STRIP: NEGATIVE
BUN SERPL-MCNC: 24 MG/DL (ref 7–18)
CHLORIDE SERPL-SCNC: 106 MMOL/L (ref 98–107)
CO2 SERPL-SCNC: 24 MMOL/L (ref 21–32)
COLOR UR: YELLOW
CREAT SERPL-MCNC: 0.8 MG/DL (ref 0.6–1.3)
DEPRECATED SQUAMOUS URNS QL MICRO: (no result) /HPF
EOSINOPHIL # BLD AUTO: 0 K/UL (ref 0–0.7)
EOSINOPHIL NFR BLD AUTO: 0.1 % (ref 0–7)
GLUCOSE SERPL-MCNC: 93 MG/DL (ref 74–106)
GLUCOSE UR STRIP-MCNC: NEGATIVE MG/DL
HCT VFR BLD AUTO: 35.7 % (ref 36.7–47.1)
HGB BLD-MCNC: 11.6 G/DL (ref 12.5–16.3)
HGB UR QL STRIP: (no result)
IRON SERPL-MCNC: 58 UG/DL (ref 50–175)
KETONES UR STRIP-MCNC: NEGATIVE MG/DL
LEUKOCYTE ESTERASE UR QL STRIP: (no result)
LYMPHOCYTES # BLD AUTO: 4.7 K/UL (ref 20–40)
LYMPHOCYTES NFR BLD AUTO: 33.5 % (ref 20.5–51.5)
MAGNESIUM SERPL-MCNC: 2.3 MG/DL (ref 1.8–2.4)
MCH RBC QN AUTO: 26.5 UUG (ref 23.8–33.4)
MCHC RBC AUTO-ENTMCNC: 33 G/DL (ref 32.5–36.3)
MCV RBC AUTO: 81.4 FL (ref 73–96.2)
MONOCYTES # BLD AUTO: 0.9 K/UL (ref 2–10)
MONOCYTES NFR BLD AUTO: 6.3 % (ref 0–11)
NEUTROPHILS # BLD AUTO: 8.4 K/UL (ref 1.8–8.9)
NEUTROPHILS NFR BLD AUTO: 60 % (ref 38.5–71.5)
NITRITE UR QL STRIP: NEGATIVE
PH UR STRIP: >=9 [PH] (ref 5–8)
PHOSPHATE SERPL-MCNC: 3.7 MG/DL (ref 2.5–4.9)
PLATELET # BLD AUTO: 320 K/UL (ref 152–348)
POTASSIUM SERPL-SCNC: 4.2 MMOL/L (ref 3.5–5.1)
RBC # BLD AUTO: 4.39 MIL/UL (ref 4.06–5.63)
SP GR UR STRIP: 1.01 (ref 1–1.03)
UROBILINOGEN UR STRIP-MCNC: 0.2 E.U./DL
WBC # BLD AUTO: 13.9 K/UL (ref 3.6–10.2)
WBC #/AREA URNS HPF: (no result) /HPF
WBC #/AREA URNS HPF: (no result) /HPF (ref 0–3)
WS STN SPEC: 6.7 G/DL (ref 6.4–8.2)

## 2020-08-13 RX ADMIN — ANALGESIC BALM SCH APPLIC: 1.74; 4.06 OINTMENT TOPICAL at 14:47

## 2020-08-13 RX ADMIN — CARBIDOPA AND LEVODOPA SCH TAB: 25; 100 TABLET ORAL at 14:47

## 2020-08-13 RX ADMIN — AMANTADINE HYDROCHLORIDE SCH MG: 100 CAPSULE, GELATIN COATED ORAL at 08:56

## 2020-08-13 RX ADMIN — CARBIDOPA AND LEVODOPA SCH TAB: 25; 100 TABLET ORAL at 05:41

## 2020-08-13 RX ADMIN — QUETIAPINE SCH MG: 25 TABLET, FILM COATED ORAL at 20:32

## 2020-08-13 RX ADMIN — LOSARTAN POTASSIUM SCH MG: 50 TABLET, FILM COATED ORAL at 08:56

## 2020-08-13 RX ADMIN — CARBIDOPA AND LEVODOPA SCH TAB: 25; 100 TABLET ORAL at 09:11

## 2020-08-13 RX ADMIN — PANTOPRAZOLE SODIUM SCH MG: 40 TABLET, DELAYED RELEASE ORAL at 05:41

## 2020-08-13 RX ADMIN — HYDROCODONE BITARTRATE AND ACETAMINOPHEN PRN TAB: 5; 325 TABLET ORAL at 09:04

## 2020-08-13 RX ADMIN — ANALGESIC BALM SCH GM: 1.74; 4.06 OINTMENT TOPICAL at 22:02

## 2020-08-13 RX ADMIN — ANALGESIC BALM SCH GM: 1.74; 4.06 OINTMENT TOPICAL at 05:41

## 2020-08-13 RX ADMIN — CARBIDOPA AND LEVODOPA SCH TAB: 25; 100 TABLET ORAL at 17:47

## 2020-08-13 RX ADMIN — DONEPEZIL HYDROCHLORIDE SCH MG: 5 TABLET, FILM COATED ORAL at 20:32

## 2020-08-13 RX ADMIN — TAMSULOSIN HYDROCHLORIDE SCH MG: 0.4 CAPSULE ORAL at 20:32

## 2020-08-13 RX ADMIN — AMANTADINE HYDROCHLORIDE SCH MG: 100 CAPSULE, GELATIN COATED ORAL at 20:32

## 2020-08-13 NOTE — NUR
Awake, alert and watching TV at this time. Denies any pain/discomforts at this time. Safety 
measures and fall precaution maintained. Continue care as planned.

## 2020-08-13 NOTE — NUR
Shift End Report: Slept well. No complaint presented all night. All needs attended and met. 
No significant event reported . Continue current plan of care.

## 2020-08-13 NOTE — NUR
Patient alert, oriented x 3, not in any form of distress, on room air. He denies any pain or 
discomfort. Hand tremors still noted. Patient compliant with medications. Canada catheter in 
place and patent,draining clear yellow urine. Urine specimen collected and sent to lab. 
Assisted patient with meals as requested by patient due to tremors. Patient seen by Dr. Fox and wanted contact information of patient's pyschiatrist. Will follow up with 
daughter Brigitte. Patient seen by Dr. Cuadra, informed MD regarding increased WBC with no new 
order at this time and said he will look into it. Needs attended to promptly. Call light and 
frequently used items placed within patient's reach. Will endorse accordingly to night shift 
nurse.

## 2020-08-14 VITALS — SYSTOLIC BLOOD PRESSURE: 139 MMHG | DIASTOLIC BLOOD PRESSURE: 65 MMHG

## 2020-08-14 VITALS — SYSTOLIC BLOOD PRESSURE: 136 MMHG | DIASTOLIC BLOOD PRESSURE: 69 MMHG

## 2020-08-14 VITALS — SYSTOLIC BLOOD PRESSURE: 142 MMHG | DIASTOLIC BLOOD PRESSURE: 69 MMHG

## 2020-08-14 VITALS — DIASTOLIC BLOOD PRESSURE: 60 MMHG | SYSTOLIC BLOOD PRESSURE: 135 MMHG

## 2020-08-14 RX ADMIN — CARBIDOPA AND LEVODOPA SCH TAB: 25; 100 TABLET ORAL at 05:23

## 2020-08-14 RX ADMIN — QUETIAPINE SCH MG: 25 TABLET, FILM COATED ORAL at 21:04

## 2020-08-14 RX ADMIN — AMANTADINE HYDROCHLORIDE SCH MG: 100 CAPSULE, GELATIN COATED ORAL at 21:04

## 2020-08-14 RX ADMIN — ANALGESIC BALM SCH GM: 1.74; 4.06 OINTMENT TOPICAL at 13:36

## 2020-08-14 RX ADMIN — AMANTADINE HYDROCHLORIDE SCH MG: 100 CAPSULE, GELATIN COATED ORAL at 08:15

## 2020-08-14 RX ADMIN — CARBIDOPA AND LEVODOPA SCH TAB: 25; 100 TABLET ORAL at 13:35

## 2020-08-14 RX ADMIN — DONEPEZIL HYDROCHLORIDE SCH MG: 5 TABLET, FILM COATED ORAL at 21:04

## 2020-08-14 RX ADMIN — ANALGESIC BALM SCH GM: 1.74; 4.06 OINTMENT TOPICAL at 05:24

## 2020-08-14 RX ADMIN — TAMSULOSIN HYDROCHLORIDE SCH MG: 0.4 CAPSULE ORAL at 21:04

## 2020-08-14 RX ADMIN — CARBIDOPA AND LEVODOPA SCH TAB: 25; 100 TABLET ORAL at 17:20

## 2020-08-14 RX ADMIN — LOSARTAN POTASSIUM SCH MG: 50 TABLET, FILM COATED ORAL at 08:14

## 2020-08-14 RX ADMIN — PANTOPRAZOLE SODIUM SCH MG: 40 TABLET, DELAYED RELEASE ORAL at 06:24

## 2020-08-14 RX ADMIN — CARBIDOPA AND LEVODOPA SCH TAB: 25; 100 TABLET ORAL at 09:34

## 2020-08-14 NOTE — NUR
patient family wife and daughter jt called, and stated they would like to get patient up 
in chair in early morning,and stay on chair as much as he can. will endorse accordingly, 
patient ate his lunch in chair, participated with PT, OT tolerated well, able to ambulate to 
the bathroom with one person assist, still noted with tremors however patient is able to 
manage eating himself. continue to monitor, needs attended timely, taking nap at this time, 
no distress noted.

## 2020-08-14 NOTE — NUR
ORDER OBTAINED TO REMOVE NO CATHETER, WILL CONTINUE TO MONITOR FOR VOIDING,WILL ENDORSE 
ACCORDINGLY

## 2020-08-14 NOTE — NUR
patient stated he feels constipated, requested ducolax suppository, order obtained and 
administered as ordered.

-------------------------------------------------------------------------------

Addendum: 08/14/20 at 0941 by ROGER BONILLA RN, RN

-------------------------------------------------------------------------------

continue to monitor for effectiveness

-------------------------------------------------------------------------------

Addendum: 08/14/20 at 1105 by ROGER BONILLA RN, RN

-------------------------------------------------------------------------------

SUPPOSITORY EFFECTIVE, PATIENT BROUGHT TO THE TOILET AND HAD LARGE BM

## 2020-08-14 NOTE — NUR
Shift End Report: Slept well. No significant event reported all night. VS stable. Continue 
current rehab plan of care.

## 2020-08-14 NOTE — NUR
endorsed to night shift nurse to endorse in the morning to get up patient early as family 
request and patient request

## 2020-08-14 NOTE — NUR
SPOKE TO DR MACKENZIE ASSISTANT FROM OFFICE STATED DR MACKENZIE IS AWARE ABOUT THE CONSULT

-------------------------------------------------------------------------------

Addendum: 08/14/20 at 1622 by ROGER BONILLA RN, RN

-------------------------------------------------------------------------------

CALLED AT NUMBER [phone]

## 2020-08-15 VITALS — SYSTOLIC BLOOD PRESSURE: 131 MMHG | DIASTOLIC BLOOD PRESSURE: 62 MMHG

## 2020-08-15 VITALS — DIASTOLIC BLOOD PRESSURE: 59 MMHG | SYSTOLIC BLOOD PRESSURE: 129 MMHG

## 2020-08-15 VITALS — DIASTOLIC BLOOD PRESSURE: 70 MMHG | SYSTOLIC BLOOD PRESSURE: 136 MMHG

## 2020-08-15 VITALS — SYSTOLIC BLOOD PRESSURE: 120 MMHG | DIASTOLIC BLOOD PRESSURE: 60 MMHG

## 2020-08-15 RX ADMIN — PANTOPRAZOLE SODIUM SCH MG: 40 TABLET, DELAYED RELEASE ORAL at 05:41

## 2020-08-15 RX ADMIN — TAMSULOSIN HYDROCHLORIDE SCH MG: 0.4 CAPSULE ORAL at 20:58

## 2020-08-15 RX ADMIN — AMANTADINE HYDROCHLORIDE SCH MG: 100 CAPSULE, GELATIN COATED ORAL at 20:58

## 2020-08-15 RX ADMIN — ANALGESIC BALM SCH GM: 1.74; 4.06 OINTMENT TOPICAL at 21:05

## 2020-08-15 RX ADMIN — AMANTADINE HYDROCHLORIDE SCH MG: 100 CAPSULE, GELATIN COATED ORAL at 08:52

## 2020-08-15 RX ADMIN — CARBIDOPA AND LEVODOPA SCH TAB: 25; 100 TABLET ORAL at 05:40

## 2020-08-15 RX ADMIN — QUETIAPINE SCH MG: 25 TABLET, FILM COATED ORAL at 20:58

## 2020-08-15 RX ADMIN — ANALGESIC BALM SCH GM: 1.74; 4.06 OINTMENT TOPICAL at 05:40

## 2020-08-15 RX ADMIN — DONEPEZIL HYDROCHLORIDE SCH MG: 5 TABLET, FILM COATED ORAL at 20:58

## 2020-08-15 RX ADMIN — LOSARTAN POTASSIUM SCH MG: 50 TABLET, FILM COATED ORAL at 08:52

## 2020-08-15 RX ADMIN — ANALGESIC BALM SCH GM: 1.74; 4.06 OINTMENT TOPICAL at 14:09

## 2020-08-15 RX ADMIN — CLONAZEPAM SCH MG: 0.5 TABLET ORAL at 08:52

## 2020-08-15 RX ADMIN — CARBIDOPA AND LEVODOPA SCH TAB: 25; 100 TABLET ORAL at 09:26

## 2020-08-15 RX ADMIN — CARBIDOPA AND LEVODOPA SCH TAB: 25; 100 TABLET ORAL at 18:02

## 2020-08-15 RX ADMIN — CLONAZEPAM SCH MG: 0.5 TABLET ORAL at 15:41

## 2020-08-15 RX ADMIN — ANALGESIC BALM SCH GM: 1.74; 4.06 OINTMENT TOPICAL at 00:25

## 2020-08-15 RX ADMIN — CARBIDOPA AND LEVODOPA SCH TAB: 25; 100 TABLET ORAL at 14:09

## 2020-08-15 RX ADMIN — MAGNESIUM HYDROXIDE PRN ML: 400 SUSPENSION ORAL at 14:40

## 2020-08-15 NOTE — NUR
Patient received sitting up on w/c at the start of the shift. No acute distress noted. Pt. 
is AAO x 1-2, Farsi speaking mostly; able to express needs. Vital signs stable for patient. 
Medications administered as ordered and scheduled. Patient on Klonopin 0.25mg PO half a tab. 
On continuos PT/OT therapy. Patient is ambulatory with a walker and one person assist. Needs 
attended, safety measures in place, call light left at bed side and will continue with care.

## 2020-08-15 NOTE — NUR
awake alert and oriented x2-3 No acute distress noted.

patient had an episode of incontinence this shift, voided

moderate amount of urine. Bladder scan done. noted 264cc

in bladder. Kept clean and dry. VSS. Needs attended. Fall

precautions maintained. Call bell within reach. Bed alarm on. 

Tolerated po meds well. Will monitor patient. Siderails up

for safety. No complaints presented during shift.

## 2020-08-15 NOTE — NUR
No complains of pain during shift, NO SOB noted. patient voided clear yellow urine. Patient 
been talking to family through face time. Needs attended, safety measures in place and will 
continue with care.

## 2020-08-15 NOTE — NUR
Shift End Report: Slept good. No complaint presented all night. All needs attended and met. 
No fall/injury. No significant event reported. Continue current rehab plan of care.

## 2020-08-15 NOTE — NUR
Patient noted with full diaper. Did bladder scan and patient noted with 314cc of urine. 
Informed NP with an order to do bladder scan after each void to catheterize patient if above 
350cc.

## 2020-08-16 VITALS — DIASTOLIC BLOOD PRESSURE: 79 MMHG | SYSTOLIC BLOOD PRESSURE: 106 MMHG

## 2020-08-16 VITALS — DIASTOLIC BLOOD PRESSURE: 71 MMHG | SYSTOLIC BLOOD PRESSURE: 135 MMHG

## 2020-08-16 RX ADMIN — PANTOPRAZOLE SODIUM SCH MG: 40 TABLET, DELAYED RELEASE ORAL at 06:28

## 2020-08-16 RX ADMIN — CARBIDOPA AND LEVODOPA SCH TAB: 25; 100 TABLET ORAL at 20:28

## 2020-08-16 RX ADMIN — CLONAZEPAM SCH MG: 0.5 TABLET ORAL at 14:54

## 2020-08-16 RX ADMIN — MAGNESIUM HYDROXIDE PRN ML: 400 SUSPENSION ORAL at 08:59

## 2020-08-16 RX ADMIN — CARBIDOPA AND LEVODOPA SCH TAB: 25; 100 TABLET ORAL at 17:00

## 2020-08-16 RX ADMIN — CLONAZEPAM SCH MG: 0.5 TABLET ORAL at 08:57

## 2020-08-16 RX ADMIN — LOSARTAN POTASSIUM SCH MG: 50 TABLET, FILM COATED ORAL at 08:57

## 2020-08-16 RX ADMIN — ANALGESIC BALM SCH GM: 1.74; 4.06 OINTMENT TOPICAL at 22:01

## 2020-08-16 RX ADMIN — AMANTADINE HYDROCHLORIDE SCH MG: 100 CAPSULE, GELATIN COATED ORAL at 08:58

## 2020-08-16 RX ADMIN — ANALGESIC BALM SCH GM: 1.74; 4.06 OINTMENT TOPICAL at 14:50

## 2020-08-16 RX ADMIN — CARBIDOPA AND LEVODOPA SCH TAB: 25; 100 TABLET ORAL at 18:29

## 2020-08-16 RX ADMIN — ANALGESIC BALM SCH GM: 1.74; 4.06 OINTMENT TOPICAL at 05:43

## 2020-08-16 RX ADMIN — TAMSULOSIN HYDROCHLORIDE SCH MG: 0.4 CAPSULE ORAL at 20:28

## 2020-08-16 RX ADMIN — DONEPEZIL HYDROCHLORIDE SCH MG: 5 TABLET, FILM COATED ORAL at 20:28

## 2020-08-16 RX ADMIN — QUETIAPINE SCH MG: 25 TABLET, FILM COATED ORAL at 20:29

## 2020-08-16 RX ADMIN — CARBIDOPA AND LEVODOPA SCH TAB: 25; 100 TABLET ORAL at 05:43

## 2020-08-16 RX ADMIN — AMANTADINE HYDROCHLORIDE SCH MG: 100 CAPSULE, GELATIN COATED ORAL at 20:28

## 2020-08-16 RX ADMIN — CARBIDOPA AND LEVODOPA SCH TAB: 25; 100 TABLET ORAL at 14:49

## 2020-08-16 RX ADMIN — CARBIDOPA AND LEVODOPA SCH TAB: 25; 100 TABLET ORAL at 08:58

## 2020-08-16 NOTE — NUR
End of shift note: Slept most of the shift. AAOx2-3 

OOB to the BR with walker. Gait unsteady. Fall risk

maintained. Incontinent of urine. Bladder scan 249 

noted. Will monitor patient.VSS.

## 2020-08-17 VITALS — DIASTOLIC BLOOD PRESSURE: 60 MMHG | SYSTOLIC BLOOD PRESSURE: 107 MMHG

## 2020-08-17 VITALS — DIASTOLIC BLOOD PRESSURE: 61 MMHG | SYSTOLIC BLOOD PRESSURE: 140 MMHG

## 2020-08-17 VITALS — SYSTOLIC BLOOD PRESSURE: 157 MMHG | DIASTOLIC BLOOD PRESSURE: 72 MMHG

## 2020-08-17 VITALS — SYSTOLIC BLOOD PRESSURE: 108 MMHG | DIASTOLIC BLOOD PRESSURE: 58 MMHG

## 2020-08-17 RX ADMIN — TAMSULOSIN HYDROCHLORIDE SCH MG: 0.4 CAPSULE ORAL at 20:19

## 2020-08-17 RX ADMIN — ANALGESIC BALM SCH APPLIC: 1.74; 4.06 OINTMENT TOPICAL at 22:08

## 2020-08-17 RX ADMIN — CLONAZEPAM SCH MG: 0.5 TABLET ORAL at 14:12

## 2020-08-17 RX ADMIN — PANTOPRAZOLE SODIUM SCH MG: 40 TABLET, DELAYED RELEASE ORAL at 06:43

## 2020-08-17 RX ADMIN — CLONAZEPAM SCH MG: 0.5 TABLET ORAL at 09:13

## 2020-08-17 RX ADMIN — AMANTADINE HYDROCHLORIDE SCH MG: 100 CAPSULE, GELATIN COATED ORAL at 09:13

## 2020-08-17 RX ADMIN — CARBIDOPA AND LEVODOPA SCH TAB: 25; 100 TABLET ORAL at 20:19

## 2020-08-17 RX ADMIN — MAGNESIUM HYDROXIDE PRN ML: 400 SUSPENSION ORAL at 18:13

## 2020-08-17 RX ADMIN — CARBIDOPA AND LEVODOPA SCH TAB: 25; 100 TABLET ORAL at 09:13

## 2020-08-17 RX ADMIN — CARBIDOPA AND LEVODOPA SCH TAB: 25; 100 TABLET ORAL at 16:30

## 2020-08-17 RX ADMIN — CARBIDOPA AND LEVODOPA SCH TAB: 25; 100 TABLET ORAL at 12:20

## 2020-08-17 RX ADMIN — AMANTADINE HYDROCHLORIDE SCH MG: 100 CAPSULE, GELATIN COATED ORAL at 20:19

## 2020-08-17 RX ADMIN — LOSARTAN POTASSIUM SCH MG: 50 TABLET, FILM COATED ORAL at 09:14

## 2020-08-17 RX ADMIN — DONEPEZIL HYDROCHLORIDE SCH MG: 5 TABLET, FILM COATED ORAL at 20:19

## 2020-08-17 RX ADMIN — ANALGESIC BALM SCH GM: 1.74; 4.06 OINTMENT TOPICAL at 06:43

## 2020-08-17 RX ADMIN — QUETIAPINE SCH MG: 25 TABLET, FILM COATED ORAL at 20:19

## 2020-08-17 RX ADMIN — ANALGESIC BALM SCH GM: 1.74; 4.06 OINTMENT TOPICAL at 14:12

## 2020-08-17 NOTE — NUR
Received patient awake in bed in stable condition. Continue therapy for participation in ADL 
activities. Patient continue monitoring urine output. Assisted to bathroom with walker. 
tolerated well. urinate once this morning. no complaint of pain/discomfort noted. will 
continue monitor

## 2020-08-17 NOTE — NUR
Received pt sitting in the wheelchair. AAO x3. No acute distress noted. Denies pain/ 
discomfort. Assisted pt back to bed, unstable. Due meds given as ordered, given one at a 
time with pudding. Tolerated well. Turned and repositioned. Both heels offloaded. Pt on 
monitoring for bladder scan after voiding. Safety measures maintained. Call light and 
personal items within reach. Will continue to monitor.

## 2020-08-17 NOTE — NUR
Pt assessed for urinary retention. Diaper with moderate amount of urine. Bladder scan 624mL. 
Cred, pt voided approximately 300mL in the urinal. No complaint of discomfort. Will 
continue to monitor.

## 2020-08-17 NOTE — NUR
Received patient in bed, AAO x3 with episodes of forgetfulness. No acute distress or SOB was 
noted. On room air. No complain of pain. All due medications administered and well 
tolerated. Bladder scan done after voiding showing 41 ml. Diaper changed and kept patient 
clean and dry. Physical assessment done. All needs attended promptly. Safety measures 
maintained. Fall prevention maintained. Bed in lock position, Side rails up x2 for safety. 
Continue to monitor and will endorse to oncoming nurse accordingly.

## 2020-08-18 VITALS — SYSTOLIC BLOOD PRESSURE: 103 MMHG | DIASTOLIC BLOOD PRESSURE: 59 MMHG

## 2020-08-18 VITALS — SYSTOLIC BLOOD PRESSURE: 119 MMHG | DIASTOLIC BLOOD PRESSURE: 62 MMHG

## 2020-08-18 VITALS — DIASTOLIC BLOOD PRESSURE: 59 MMHG | SYSTOLIC BLOOD PRESSURE: 119 MMHG

## 2020-08-18 VITALS — SYSTOLIC BLOOD PRESSURE: 111 MMHG | DIASTOLIC BLOOD PRESSURE: 61 MMHG

## 2020-08-18 RX ADMIN — DONEPEZIL HYDROCHLORIDE SCH MG: 5 TABLET, FILM COATED ORAL at 20:30

## 2020-08-18 RX ADMIN — ANALGESIC BALM SCH APPLIC: 1.74; 4.06 OINTMENT TOPICAL at 14:42

## 2020-08-18 RX ADMIN — AMANTADINE HYDROCHLORIDE SCH MG: 100 CAPSULE, GELATIN COATED ORAL at 09:01

## 2020-08-18 RX ADMIN — ANALGESIC BALM SCH APPLIC: 1.74; 4.06 OINTMENT TOPICAL at 06:06

## 2020-08-18 RX ADMIN — CLONAZEPAM SCH MG: 0.5 TABLET ORAL at 08:59

## 2020-08-18 RX ADMIN — LOSARTAN POTASSIUM SCH MG: 50 TABLET, FILM COATED ORAL at 09:00

## 2020-08-18 RX ADMIN — CARBIDOPA AND LEVODOPA SCH TAB: 25; 100 TABLET ORAL at 16:33

## 2020-08-18 RX ADMIN — CARBIDOPA AND LEVODOPA SCH TAB: 25; 100 TABLET ORAL at 09:00

## 2020-08-18 RX ADMIN — QUETIAPINE SCH MG: 25 TABLET, FILM COATED ORAL at 20:31

## 2020-08-18 RX ADMIN — CARBIDOPA AND LEVODOPA SCH TAB: 25; 100 TABLET ORAL at 20:31

## 2020-08-18 RX ADMIN — CARBIDOPA AND LEVODOPA SCH TAB: 25; 100 TABLET ORAL at 12:06

## 2020-08-18 RX ADMIN — CLONAZEPAM SCH MG: 0.5 TABLET ORAL at 16:33

## 2020-08-18 RX ADMIN — ANALGESIC BALM SCH APPLIC: 1.74; 4.06 OINTMENT TOPICAL at 22:03

## 2020-08-18 RX ADMIN — PANTOPRAZOLE SODIUM SCH MG: 40 TABLET, DELAYED RELEASE ORAL at 06:06

## 2020-08-18 RX ADMIN — MAGNESIUM HYDROXIDE PRN ML: 400 SUSPENSION ORAL at 17:55

## 2020-08-18 RX ADMIN — AMANTADINE HYDROCHLORIDE SCH MG: 100 CAPSULE, GELATIN COATED ORAL at 20:31

## 2020-08-18 RX ADMIN — TAMSULOSIN HYDROCHLORIDE SCH MG: 0.4 CAPSULE ORAL at 20:31

## 2020-08-18 NOTE — NUR
patient is alert,awake, no sob, resp even nonlabored, skin warm and dry to touch, noted 
improvement in tremors. no distress noted, able to void, continue to monitor for postvoiding 
residual as ordered. no distress noted.

## 2020-08-18 NOTE — NUR
Awake, in room, up on wheelchair at bedside. Denies any pain/discomforts at this time. No 
s/s of respiratory distress. Safety measures and fall prevention maintained. Continue care 
as planned.

## 2020-08-18 NOTE — NUR
Patient was back to bed with 1 person assist. Bladder incontinence noted. Blader scan 
performed showed 101 cc of urine retention. Good chon care/skin care rendered. Patient very 
cooperative. Repositioned for comfort.

## 2020-08-18 NOTE — NUR
patient voided three times in the diaper large amount, bladder scan was performed post 
voiding, result 318, and 487, patient refused to do straight cath on him, risks and benefits 
explained, however no other symptoms noted such no distended bladder noted, patient denied 
any discomfort upon light palpation of bladder, continue to monitor.

## 2020-08-19 VITALS — DIASTOLIC BLOOD PRESSURE: 63 MMHG | SYSTOLIC BLOOD PRESSURE: 127 MMHG

## 2020-08-19 VITALS — DIASTOLIC BLOOD PRESSURE: 66 MMHG | SYSTOLIC BLOOD PRESSURE: 116 MMHG

## 2020-08-19 VITALS — DIASTOLIC BLOOD PRESSURE: 65 MMHG | SYSTOLIC BLOOD PRESSURE: 140 MMHG

## 2020-08-19 VITALS — DIASTOLIC BLOOD PRESSURE: 61 MMHG | SYSTOLIC BLOOD PRESSURE: 128 MMHG

## 2020-08-19 RX ADMIN — ANALGESIC BALM SCH APPLIC: 1.74; 4.06 OINTMENT TOPICAL at 22:05

## 2020-08-19 RX ADMIN — ANALGESIC BALM SCH APPLIC: 1.74; 4.06 OINTMENT TOPICAL at 06:05

## 2020-08-19 RX ADMIN — DONEPEZIL HYDROCHLORIDE SCH MG: 5 TABLET, FILM COATED ORAL at 20:33

## 2020-08-19 RX ADMIN — CARBIDOPA AND LEVODOPA SCH TAB: 25; 100 TABLET ORAL at 08:08

## 2020-08-19 RX ADMIN — Medication SCH MG: at 20:34

## 2020-08-19 RX ADMIN — CLONAZEPAM SCH MG: 0.5 TABLET ORAL at 08:12

## 2020-08-19 RX ADMIN — CLONAZEPAM SCH MG: 0.5 TABLET ORAL at 15:36

## 2020-08-19 RX ADMIN — AMANTADINE HYDROCHLORIDE SCH MG: 100 CAPSULE, GELATIN COATED ORAL at 08:13

## 2020-08-19 RX ADMIN — AMANTADINE HYDROCHLORIDE SCH MG: 100 CAPSULE, GELATIN COATED ORAL at 20:34

## 2020-08-19 RX ADMIN — CARBIDOPA AND LEVODOPA SCH TAB: 25; 100 TABLET ORAL at 20:33

## 2020-08-19 RX ADMIN — TAMSULOSIN HYDROCHLORIDE SCH MG: 0.4 CAPSULE ORAL at 20:33

## 2020-08-19 RX ADMIN — HYDROCODONE BITARTRATE AND ACETAMINOPHEN PRN TAB: 5; 325 TABLET ORAL at 12:46

## 2020-08-19 RX ADMIN — CARBIDOPA AND LEVODOPA SCH TAB: 25; 100 TABLET ORAL at 16:51

## 2020-08-19 RX ADMIN — LOSARTAN POTASSIUM SCH MG: 50 TABLET, FILM COATED ORAL at 08:08

## 2020-08-19 RX ADMIN — ANALGESIC BALM SCH APPLIC: 1.74; 4.06 OINTMENT TOPICAL at 13:49

## 2020-08-19 RX ADMIN — PANTOPRAZOLE SODIUM SCH MG: 40 TABLET, DELAYED RELEASE ORAL at 06:26

## 2020-08-19 RX ADMIN — QUETIAPINE SCH MG: 25 TABLET, FILM COATED ORAL at 20:33

## 2020-08-19 RX ADMIN — CARBIDOPA AND LEVODOPA SCH TAB: 25; 100 TABLET ORAL at 12:35

## 2020-08-19 NOTE — NUR
Nera ID made aware about post voiding residual results are during day is more than 350ml, in 
450s- 500s, and patient strongly refusing the straight cath, despite explaining the risks 
and benefits, however patient urinates large amount of urine in the toilet,and in diaper, no 
distended bladder noted, patient denied any bladder discomfort, per Nera ID change the order 
to straight cath if post voiding residual is more than 500ml, order noted.

## 2020-08-19 NOTE — NUR
Bladder incontinence noted, post void residual of 45 ml after bladder scan performed. Good 
chon care/skin care rendered.

## 2020-08-19 NOTE — NUR
Shift End Report: Slept well. VS stable. No complaint presented. Bladder scan performed 
after each voiding/bladder incontinence as ordered. No s/s of bladder discomforts/distention 
noted. Made comfortable at all times. No significant event reported all night. All needs 
attended. Continue current rehab plan of care.

## 2020-08-19 NOTE — NUR
Awake, alert, sitting in a wheelchair, brake on. watching TV at this time. Denied any 
pain/discomforts. No s/s of respiratory distress. Safety measures and fall prevention 
maintained. Continue care as planned.

## 2020-08-19 NOTE — NUR
Diaper soaked and wet with urine. Bladder scan performed as ordered, 119 ml post void scan. 
Will continue to monitor. Assisted back to bed. Made comfortable.

## 2020-08-19 NOTE — NUR
Mazin Pacheco made aware about patient refusal of straight cath for post voiding residual, 
started on new medication hytrin at night, however patient denied any bladder discomfort. 
continue to monitor.

## 2020-08-19 NOTE — NUR
patient noted overconcernd about his BM, patient wife calls multiple times regarding his BM 
concern as well, despite explaining the medication  interventions being  done according 
doctors orders, brought to toilet multiple times, fleet enema given yesterday, MOM given 
yesterday, no results, however patient is not impacted upon digital assessment as ordered by 
MD. suppository administered today before breakfast. continue to monitor for effectiveness 

-------------------------------------------------------------------------------

Addendum: 08/19/20 at 1130 by ROGER BONILLA RN, RN

-------------------------------------------------------------------------------

upon investigation staff reported patient made BM on Sunday on 8/16/2020

## 2020-08-20 VITALS — SYSTOLIC BLOOD PRESSURE: 113 MMHG | DIASTOLIC BLOOD PRESSURE: 63 MMHG

## 2020-08-20 VITALS — DIASTOLIC BLOOD PRESSURE: 58 MMHG | SYSTOLIC BLOOD PRESSURE: 110 MMHG

## 2020-08-20 VITALS — DIASTOLIC BLOOD PRESSURE: 52 MMHG | SYSTOLIC BLOOD PRESSURE: 113 MMHG

## 2020-08-20 VITALS — DIASTOLIC BLOOD PRESSURE: 55 MMHG | SYSTOLIC BLOOD PRESSURE: 98 MMHG

## 2020-08-20 RX ADMIN — QUETIAPINE SCH MG: 25 TABLET, FILM COATED ORAL at 20:59

## 2020-08-20 RX ADMIN — CLONAZEPAM SCH MG: 0.5 TABLET ORAL at 08:16

## 2020-08-20 RX ADMIN — Medication SCH MG: at 21:26

## 2020-08-20 RX ADMIN — CARBIDOPA AND LEVODOPA SCH TAB: 25; 100 TABLET ORAL at 17:24

## 2020-08-20 RX ADMIN — ANALGESIC BALM SCH APPLIC: 1.74; 4.06 OINTMENT TOPICAL at 13:39

## 2020-08-20 RX ADMIN — CLONAZEPAM SCH MG: 0.5 TABLET ORAL at 14:19

## 2020-08-20 RX ADMIN — ANALGESIC BALM SCH APPLIC: 1.74; 4.06 OINTMENT TOPICAL at 21:01

## 2020-08-20 RX ADMIN — CARBIDOPA AND LEVODOPA SCH TAB: 25; 100 TABLET ORAL at 08:17

## 2020-08-20 RX ADMIN — ANALGESIC BALM SCH APPLIC: 1.74; 4.06 OINTMENT TOPICAL at 05:49

## 2020-08-20 RX ADMIN — AMANTADINE HYDROCHLORIDE SCH MG: 100 CAPSULE, GELATIN COATED ORAL at 08:18

## 2020-08-20 RX ADMIN — LOSARTAN POTASSIUM SCH MG: 50 TABLET, FILM COATED ORAL at 08:24

## 2020-08-20 RX ADMIN — CARBIDOPA AND LEVODOPA SCH TAB: 25; 100 TABLET ORAL at 20:59

## 2020-08-20 RX ADMIN — TAMSULOSIN HYDROCHLORIDE SCH MG: 0.4 CAPSULE ORAL at 20:59

## 2020-08-20 RX ADMIN — AMANTADINE HYDROCHLORIDE SCH MG: 100 CAPSULE, GELATIN COATED ORAL at 21:00

## 2020-08-20 RX ADMIN — CARBIDOPA AND LEVODOPA SCH TAB: 25; 100 TABLET ORAL at 12:26

## 2020-08-20 RX ADMIN — PANTOPRAZOLE SODIUM SCH MG: 40 TABLET, DELAYED RELEASE ORAL at 06:45

## 2020-08-20 RX ADMIN — DONEPEZIL HYDROCHLORIDE SCH MG: 5 TABLET, FILM COATED ORAL at 20:59

## 2020-08-20 NOTE — NUR
OOBin wheelchair upo rounds. AAOx3-4 needs attended. VSS.

Tolerated po meds well. Assisted back in bed with minimal assist.

Compliant with meds. incontinent of urine/sometimes voids in the

urinal. Bladder scan done as ordered PVD. Will monitor patient. 

Fall precautions maintained. Siderails up for safety.

## 2020-08-20 NOTE — NUR
Patient awake, alert, not in any form of distress, on room air. Assisted patient to sit on 
the wheelchair for breakfast and tolerated meal. Due medications administered and tolerated 
well. He denies any pain or discomfort. Kept clean and comfortable. Call light and 
frequently used items placed within patient's reach. Safety measures maintained.

## 2020-08-20 NOTE — NUR
Patient remained alert and no significant event noted during the shift. He was able to void 
freely and post void bladder scan done twice noted as 78cc and 110cc in morning and 
afternoon respectively. No complain of any discomfort or pain. Covid test done and sent to 
laboratory.

## 2020-08-20 NOTE — NUR
Shift End Report: Slept well. No complaint presented throughout the night. No fall/injury. 
All needs attended and met. No significant event report. VS stable. Continue current rehab 
plan of care.

## 2020-08-21 VITALS — SYSTOLIC BLOOD PRESSURE: 115 MMHG | DIASTOLIC BLOOD PRESSURE: 50 MMHG

## 2020-08-21 VITALS — SYSTOLIC BLOOD PRESSURE: 115 MMHG | DIASTOLIC BLOOD PRESSURE: 68 MMHG

## 2020-08-21 VITALS — SYSTOLIC BLOOD PRESSURE: 116 MMHG | DIASTOLIC BLOOD PRESSURE: 68 MMHG

## 2020-08-21 VITALS — SYSTOLIC BLOOD PRESSURE: 133 MMHG | DIASTOLIC BLOOD PRESSURE: 57 MMHG

## 2020-08-21 RX ADMIN — ANALGESIC BALM SCH APPLIC: 1.74; 4.06 OINTMENT TOPICAL at 21:35

## 2020-08-21 RX ADMIN — TAMSULOSIN HYDROCHLORIDE SCH MG: 0.4 CAPSULE ORAL at 21:30

## 2020-08-21 RX ADMIN — PANTOPRAZOLE SODIUM SCH MG: 40 TABLET, DELAYED RELEASE ORAL at 06:12

## 2020-08-21 RX ADMIN — ANALGESIC BALM SCH APPLIC: 1.74; 4.06 OINTMENT TOPICAL at 06:12

## 2020-08-21 RX ADMIN — QUETIAPINE SCH MG: 25 TABLET, FILM COATED ORAL at 21:32

## 2020-08-21 RX ADMIN — CLONAZEPAM SCH MG: 0.5 TABLET ORAL at 15:35

## 2020-08-21 RX ADMIN — CARBIDOPA AND LEVODOPA SCH TAB: 25; 100 TABLET ORAL at 21:31

## 2020-08-21 RX ADMIN — AMANTADINE HYDROCHLORIDE SCH MG: 100 CAPSULE, GELATIN COATED ORAL at 21:34

## 2020-08-21 RX ADMIN — CARBIDOPA AND LEVODOPA SCH TAB: 25; 100 TABLET ORAL at 08:30

## 2020-08-21 RX ADMIN — ANALGESIC BALM SCH APPLIC: 1.74; 4.06 OINTMENT TOPICAL at 14:16

## 2020-08-21 RX ADMIN — CARBIDOPA AND LEVODOPA SCH TAB: 25; 100 TABLET ORAL at 13:11

## 2020-08-21 RX ADMIN — CARBIDOPA AND LEVODOPA SCH TAB: 25; 100 TABLET ORAL at 17:15

## 2020-08-21 RX ADMIN — Medication SCH MG: at 21:35

## 2020-08-21 RX ADMIN — CLONAZEPAM SCH MG: 0.5 TABLET ORAL at 08:30

## 2020-08-21 RX ADMIN — AMANTADINE HYDROCHLORIDE SCH MG: 100 CAPSULE, GELATIN COATED ORAL at 09:01

## 2020-08-21 RX ADMIN — LOSARTAN POTASSIUM SCH MG: 50 TABLET, FILM COATED ORAL at 08:47

## 2020-08-21 RX ADMIN — DONEPEZIL HYDROCHLORIDE SCH MG: 5 TABLET, FILM COATED ORAL at 21:31

## 2020-08-21 NOTE — NUR
Received patient in bed, AAO x3 with episodes of forgetfulness. No acute distress or SOB was 
noted. On room air. No complain of pain. All due medications administered and well 
tolerated. Physical assessment done. All needs attended promptly. Safety measures 
maintained. Fall prevention maintained. Bed in locked position, Side rails up x2 for safety. 
Continue to monitor.

## 2020-08-21 NOTE — NUR
Patient awake, alert, not in any form of distress, on room air.  He denies any pain or 
discomfort. Patient voiding freely with post void bladder scan of 260ml. Kept clean and 
cofortable. Assisted patient to transfer to wheelchair for breakfast. Patient states he have 
less tremors than before and patient is noted to be a little bit able to feed self with 
lesser assistance. Due medications administered and patient tolerated well. Patient taken to 
rehab gym by occupational therapist for exercises. Will continue to monitor.

## 2020-08-21 NOTE — NUR
End of shift notes: Slept well most of the shift. AAox3  No acute

distress noted. Incontinent of urine x2. Kept clean and dry. Patient

on bladder scan after each void. Will monitor patient. Bladder scan x2 

234ml and 391 ml noted. Fall precautions maintained. Siderails up

for safety. VSS.

## 2020-08-21 NOTE — NUR
Patient participated with PT and OT and speech therapy during the shift and tolerated well. 
He is assisted to the wheelchair to sit during meal times. Post void residual on bladder 
scan is 476ml before dinner. Patient denies any pain or discomfort. Will endorse 
accordingly.

## 2020-08-21 NOTE — NUR
Received an order from Dr. Galindo for Urology consult. Called Dr. Dhruv Israel urology on 
call, spoke to Ramirez from MD's office said he will inform MD. Faxed patient's face sheet to 
the office as requested.

## 2020-08-22 VITALS — DIASTOLIC BLOOD PRESSURE: 69 MMHG | SYSTOLIC BLOOD PRESSURE: 127 MMHG

## 2020-08-22 VITALS — SYSTOLIC BLOOD PRESSURE: 114 MMHG | DIASTOLIC BLOOD PRESSURE: 62 MMHG

## 2020-08-22 VITALS — DIASTOLIC BLOOD PRESSURE: 56 MMHG | SYSTOLIC BLOOD PRESSURE: 111 MMHG

## 2020-08-22 VITALS — SYSTOLIC BLOOD PRESSURE: 123 MMHG | DIASTOLIC BLOOD PRESSURE: 66 MMHG

## 2020-08-22 RX ADMIN — ANALGESIC BALM SCH APPLIC: 1.74; 4.06 OINTMENT TOPICAL at 06:24

## 2020-08-22 RX ADMIN — CARBIDOPA AND LEVODOPA SCH TAB: 25; 100 TABLET ORAL at 20:41

## 2020-08-22 RX ADMIN — AMANTADINE HYDROCHLORIDE SCH MG: 100 CAPSULE, GELATIN COATED ORAL at 20:42

## 2020-08-22 RX ADMIN — CARBIDOPA AND LEVODOPA SCH TAB: 25; 100 TABLET ORAL at 13:06

## 2020-08-22 RX ADMIN — CARBIDOPA AND LEVODOPA SCH TAB: 25; 100 TABLET ORAL at 08:46

## 2020-08-22 RX ADMIN — TAMSULOSIN HYDROCHLORIDE SCH MG: 0.4 CAPSULE ORAL at 20:41

## 2020-08-22 RX ADMIN — ANALGESIC BALM SCH APPLIC: 1.74; 4.06 OINTMENT TOPICAL at 20:42

## 2020-08-22 RX ADMIN — CLONAZEPAM SCH MG: 0.5 TABLET ORAL at 15:34

## 2020-08-22 RX ADMIN — LOSARTAN POTASSIUM SCH MG: 50 TABLET, FILM COATED ORAL at 08:47

## 2020-08-22 RX ADMIN — QUETIAPINE SCH MG: 25 TABLET, FILM COATED ORAL at 20:41

## 2020-08-22 RX ADMIN — AMANTADINE HYDROCHLORIDE SCH MG: 100 CAPSULE, GELATIN COATED ORAL at 08:46

## 2020-08-22 RX ADMIN — PANTOPRAZOLE SODIUM SCH MG: 40 TABLET, DELAYED RELEASE ORAL at 06:23

## 2020-08-22 RX ADMIN — CARBIDOPA AND LEVODOPA SCH TAB: 25; 100 TABLET ORAL at 17:20

## 2020-08-22 RX ADMIN — ANALGESIC BALM SCH APPLIC: 1.74; 4.06 OINTMENT TOPICAL at 13:06

## 2020-08-22 RX ADMIN — Medication SCH MG: at 20:41

## 2020-08-22 RX ADMIN — DONEPEZIL HYDROCHLORIDE SCH MG: 5 TABLET, FILM COATED ORAL at 20:41

## 2020-08-22 RX ADMIN — CLONAZEPAM SCH MG: 0.5 TABLET ORAL at 08:47

## 2020-08-22 NOTE — NUR
Patient refused to have bladder scan after voiding, stated "No more doing this". Risks and 
benefits explained, still refused. Continue to monitor.

## 2020-08-22 NOTE — NUR
Received patient in bed, AAO x3 with episodes of forgetfulness speaks slowly and softly. No 
acute distress or SOB was noted. On room air. No complaints of pain at this time. All due 
medications administered and well tolerated, crushed and given with pudding. Physical 
assessment done. All needs attended promptly. Safety measures maintained. Fall prevention 
maintained. Bed in locked position, Side rails up x2 for safety. Continue to monitor through 
the night.

## 2020-08-22 NOTE — NUR
Patient had three medium amount of urination throughout the shift. Explained him again about 
the importance of bladder scanning. He stated, "I did pee a lot, nothing was left there, I 
don't want to do it again". Teresita ue to monitor.

## 2020-08-23 VITALS — DIASTOLIC BLOOD PRESSURE: 60 MMHG | SYSTOLIC BLOOD PRESSURE: 129 MMHG

## 2020-08-23 VITALS — SYSTOLIC BLOOD PRESSURE: 124 MMHG | DIASTOLIC BLOOD PRESSURE: 53 MMHG

## 2020-08-23 VITALS — DIASTOLIC BLOOD PRESSURE: 51 MMHG | SYSTOLIC BLOOD PRESSURE: 128 MMHG

## 2020-08-23 RX ADMIN — ANALGESIC BALM SCH APPLIC: 1.74; 4.06 OINTMENT TOPICAL at 13:04

## 2020-08-23 RX ADMIN — CARBIDOPA AND LEVODOPA SCH TAB: 25; 100 TABLET ORAL at 13:03

## 2020-08-23 RX ADMIN — ANALGESIC BALM SCH APPLIC: 1.74; 4.06 OINTMENT TOPICAL at 06:03

## 2020-08-23 RX ADMIN — CLONAZEPAM SCH MG: 0.5 TABLET ORAL at 08:47

## 2020-08-23 RX ADMIN — CLONAZEPAM SCH MG: 0.5 TABLET ORAL at 14:33

## 2020-08-23 RX ADMIN — LOSARTAN POTASSIUM SCH MG: 50 TABLET, FILM COATED ORAL at 08:47

## 2020-08-23 RX ADMIN — PANTOPRAZOLE SODIUM SCH MG: 40 TABLET, DELAYED RELEASE ORAL at 06:08

## 2020-08-23 RX ADMIN — CARBIDOPA AND LEVODOPA SCH TAB: 25; 100 TABLET ORAL at 08:46

## 2020-08-23 RX ADMIN — AMANTADINE HYDROCHLORIDE SCH MG: 100 CAPSULE, GELATIN COATED ORAL at 08:47

## 2020-08-23 NOTE — NUR
patient discharged to [facility], no sob, resp even nonlabored, skin warm and dry to touch, 
instructions given to patient and sent with ambulance, patient verbalized understanding of 
discharge instructions, skin intact, report given to Vini from [facility], patient is 
stable condition, ID band removed, patient picked up by [facility] ambulance. belongings are 
accounted and signed, sent with patient patient family met patient outside in the parking 
lot with [facility] ambulance.

## 2020-08-23 NOTE — NUR
Slept well. VS stable. No complaint presented.  No s/s of bladder discomforts/distention 
noted. Patient had a full void in the diaper and 1 wet incontinent pad. Post void completed 
bladder scan a noted 222cc. Kept comfortable, clean and dry at all times. No significant 
event reported all night. All needs attended. Continue current rehab plan of care.

## 2020-12-15 ENCOUNTER — HOSPITAL ENCOUNTER (INPATIENT)
Dept: HOSPITAL 54 - ER | Age: 85
LOS: 3 days | Discharge: NURSING, REHAB OR LONG-TERM CARE | DRG: 178 | End: 2020-12-18
Attending: NURSE PRACTITIONER | Admitting: NURSE PRACTITIONER
Payer: MEDICARE

## 2020-12-15 VITALS — HEIGHT: 68 IN | WEIGHT: 143 LBS | BODY MASS INDEX: 21.67 KG/M2

## 2020-12-15 VITALS — DIASTOLIC BLOOD PRESSURE: 70 MMHG | SYSTOLIC BLOOD PRESSURE: 153 MMHG

## 2020-12-15 VITALS — DIASTOLIC BLOOD PRESSURE: 64 MMHG | SYSTOLIC BLOOD PRESSURE: 143 MMHG

## 2020-12-15 DIAGNOSIS — R26.9: ICD-10-CM

## 2020-12-15 DIAGNOSIS — E78.5: ICD-10-CM

## 2020-12-15 DIAGNOSIS — G20: ICD-10-CM

## 2020-12-15 DIAGNOSIS — K21.9: ICD-10-CM

## 2020-12-15 DIAGNOSIS — Z86.73: ICD-10-CM

## 2020-12-15 DIAGNOSIS — C18.9: ICD-10-CM

## 2020-12-15 DIAGNOSIS — R13.10: ICD-10-CM

## 2020-12-15 DIAGNOSIS — Z88.6: ICD-10-CM

## 2020-12-15 DIAGNOSIS — F02.80: ICD-10-CM

## 2020-12-15 DIAGNOSIS — M19.90: ICD-10-CM

## 2020-12-15 DIAGNOSIS — Z88.8: ICD-10-CM

## 2020-12-15 DIAGNOSIS — F25.9: ICD-10-CM

## 2020-12-15 DIAGNOSIS — I10: ICD-10-CM

## 2020-12-15 DIAGNOSIS — E44.1: ICD-10-CM

## 2020-12-15 DIAGNOSIS — F32.9: ICD-10-CM

## 2020-12-15 DIAGNOSIS — Z79.899: ICD-10-CM

## 2020-12-15 DIAGNOSIS — I70.0: ICD-10-CM

## 2020-12-15 DIAGNOSIS — F41.9: ICD-10-CM

## 2020-12-15 DIAGNOSIS — U07.1: Primary | ICD-10-CM

## 2020-12-15 DIAGNOSIS — Z74.09: ICD-10-CM

## 2020-12-15 DIAGNOSIS — N40.0: ICD-10-CM

## 2020-12-15 DIAGNOSIS — R27.8: ICD-10-CM

## 2020-12-15 DIAGNOSIS — D68.59: ICD-10-CM

## 2020-12-15 LAB
BASOPHILS # BLD AUTO: 0.3 /CMM (ref 0–0.2)
BASOPHILS NFR BLD AUTO: 2.7 % (ref 0–2)
BUN SERPL-MCNC: 16 MG/DL (ref 7–18)
CALCIUM SERPL-MCNC: 9 MG/DL (ref 8.5–10.1)
CHLORIDE SERPL-SCNC: 105 MMOL/L (ref 98–107)
CK SERPL-CCNC: 58 U/L (ref 39–308)
CO2 SERPL-SCNC: 28 MMOL/L (ref 21–32)
CREAT SERPL-MCNC: 0.9 MG/DL (ref 0.6–1.3)
CRP SERPL-MCNC: 2.8 MG/DL (ref 0–0.9)
D DIMER PPP FEU-MCNC: 1.58 MG/L(FEU (ref 0.17–0.5)
EOSINOPHIL NFR BLD AUTO: 0.5 % (ref 0–6)
FERRITIN SERPL-MCNC: 279 NG/ML (ref 8–388)
GLUCOSE SERPL-MCNC: 80 MG/DL (ref 74–106)
HCT VFR BLD AUTO: 43 % (ref 39–51)
HGB BLD-MCNC: 14.4 G/DL (ref 13.5–17.5)
LYMPHOCYTES NFR BLD AUTO: 4.8 /CMM (ref 0.8–4.8)
LYMPHOCYTES NFR BLD AUTO: 50 % (ref 20–44)
MCHC RBC AUTO-ENTMCNC: 33 G/DL (ref 31–36)
MCV RBC AUTO: 83 FL (ref 80–96)
MONOCYTES NFR BLD AUTO: 0.9 /CMM (ref 0.1–1.3)
MONOCYTES NFR BLD AUTO: 9.4 % (ref 2–12)
NEUTROPHILS # BLD AUTO: 3.6 /CMM (ref 1.8–8.9)
NEUTROPHILS NFR BLD AUTO: 37.4 % (ref 43–81)
NT-PROBNP SERPL-MCNC: 361 PG/ML (ref 0–125)
PLATELET # BLD AUTO: 160 /CMM (ref 150–450)
POTASSIUM SERPL-SCNC: 3.9 MMOL/L (ref 3.5–5.1)
RBC # BLD AUTO: 5.22 MIL/UL (ref 4.5–6)
SODIUM SERPL-SCNC: 143 MMOL/L (ref 136–145)
WBC NRBC COR # BLD AUTO: 9.7 K/UL (ref 4.3–11)

## 2020-12-15 PROCEDURE — U0003 INFECTIOUS AGENT DETECTION BY NUCLEIC ACID (DNA OR RNA); SEVERE ACUTE RESPIRATORY SYNDROME CORONAVIRUS 2 (SARS-COV-2) (CORONAVIRUS DISEASE [COVID-19]), AMPLIFIED PROBE TECHNIQUE, MAKING USE OF HIGH THROUGHPUT TECHNOLOGIES AS DESCRIBED BY CMS-2020-01-R: HCPCS

## 2020-12-15 PROCEDURE — G0378 HOSPITAL OBSERVATION PER HR: HCPCS

## 2020-12-15 RX ADMIN — TAMSULOSIN HYDROCHLORIDE SCH MG: 0.4 CAPSULE ORAL at 22:24

## 2020-12-15 RX ADMIN — DONEPEZIL HYDROCHLORIDE SCH MG: 5 TABLET ORAL at 22:24

## 2020-12-15 RX ADMIN — GABAPENTIN SCH MG: 100 CAPSULE ORAL at 22:23

## 2020-12-15 RX ADMIN — TERAZOSIN HYDROCHLORIDE ANHYDROUS SCH MG: 1 CAPSULE ORAL at 22:25

## 2020-12-15 RX ADMIN — SODIUM CHLORIDE PRN MLS/HR: 4.5 INJECTION, SOLUTION INTRAVENOUS at 23:37

## 2020-12-15 RX ADMIN — ACETAMINOPHEN PRN MG: 325 TABLET ORAL at 18:34

## 2020-12-15 RX ADMIN — Medication SCH MG: at 18:34

## 2020-12-15 RX ADMIN — Medication SCH UDTAB: at 18:34

## 2020-12-15 RX ADMIN — ATORVASTATIN CALCIUM SCH MG: 10 TABLET, FILM COATED ORAL at 22:26

## 2020-12-15 RX ADMIN — Medication SCH UDTAB: at 22:23

## 2020-12-15 RX ADMIN — ENOXAPARIN SODIUM SCH MG: 40 INJECTION SUBCUTANEOUS at 18:39

## 2020-12-15 RX ADMIN — AMANTADINE HYDROCHLORIDE SCH MG: 100 CAPSULE, GELATIN COATED ORAL at 22:24

## 2020-12-15 RX ADMIN — QUETIAPINE SCH MG: 25 TABLET, FILM COATED ORAL at 22:26

## 2020-12-15 NOTE — NUR
[facility],C/O CHEST PAIN ON INSPIRATION,NTG SPRAY X 1. VS 
CHECKED. HOOKEN ON MONITOR. IV ACCESS STARTED. BLOOD DRAW DONE. . AWAITING MD ECHEVERRIA.

## 2020-12-15 NOTE — NUR
RN NOTE

RECEIVED PT IN BED A/O X 1, SR ON TELE MONITOR HR IN THE 60'S, PT ON ROOM AIR,  IV TO RAC 
PATENT INTACT AND FLUSHING WELL. CALL LIGHT WITHIN REACH, BED LOCKED AND IN THE LOWEST 
POSITION SAFETY MEASURES IN PLACE WILL CONT. TO MONITOR PT.

## 2020-12-15 NOTE — NUR
RN TELE NOTES

RECEIVED PT FROM E.R. STAFF [facility]IA [facility], PT IS AWAKE, ALERT AND VERBALLY RESPONSIVE, 
ASSISTED TO BED, MADE COMFORTABLE, WITH COMPLAINT OF SLIGHT CHEST PAIN WHEN BREATHING, 
RESPIRATIONS NORMAL, VITAL SIGNS TAKEN AND RECORDED, PM MEDS GIVEN, ADMITTING ORDERS 
RECEIVED FROM MD, KEPT COMFORTABLE AND WARM IN BED.

## 2020-12-16 VITALS — DIASTOLIC BLOOD PRESSURE: 93 MMHG | SYSTOLIC BLOOD PRESSURE: 192 MMHG

## 2020-12-16 VITALS — SYSTOLIC BLOOD PRESSURE: 142 MMHG | DIASTOLIC BLOOD PRESSURE: 63 MMHG

## 2020-12-16 VITALS — DIASTOLIC BLOOD PRESSURE: 69 MMHG | SYSTOLIC BLOOD PRESSURE: 145 MMHG

## 2020-12-16 VITALS — DIASTOLIC BLOOD PRESSURE: 83 MMHG | SYSTOLIC BLOOD PRESSURE: 149 MMHG

## 2020-12-16 VITALS — DIASTOLIC BLOOD PRESSURE: 68 MMHG | SYSTOLIC BLOOD PRESSURE: 130 MMHG

## 2020-12-16 VITALS — DIASTOLIC BLOOD PRESSURE: 61 MMHG | SYSTOLIC BLOOD PRESSURE: 130 MMHG

## 2020-12-16 LAB
ALBUMIN SERPL BCP-MCNC: 3 G/DL (ref 3.4–5)
ALP SERPL-CCNC: 92 U/L (ref 46–116)
ALT SERPL W P-5'-P-CCNC: 25 U/L (ref 12–78)
AST SERPL W P-5'-P-CCNC: 28 U/L (ref 15–37)
BASOPHILS # BLD AUTO: 0 /CMM (ref 0–0.2)
BASOPHILS NFR BLD AUTO: 0.1 % (ref 0–2)
BILIRUB DIRECT SERPL-MCNC: 0.3 MG/DL (ref 0–0.2)
BILIRUB SERPL-MCNC: 0.9 MG/DL (ref 0.2–1)
BUN SERPL-MCNC: 18 MG/DL (ref 7–18)
CALCIUM SERPL-MCNC: 8.3 MG/DL (ref 8.5–10.1)
CHLORIDE SERPL-SCNC: 105 MMOL/L (ref 98–107)
CHOLEST SERPL-MCNC: 130 MG/DL (ref ?–200)
CO2 SERPL-SCNC: 23 MMOL/L (ref 21–32)
CREAT SERPL-MCNC: 0.7 MG/DL (ref 0.6–1.3)
EOSINOPHIL NFR BLD AUTO: 0.3 % (ref 0–6)
GLUCOSE SERPL-MCNC: 70 MG/DL (ref 74–106)
HCT VFR BLD AUTO: 41 % (ref 39–51)
HDLC SERPL-MCNC: 46 MG/DL (ref 40–60)
HGB BLD-MCNC: 13.4 G/DL (ref 13.5–17.5)
LDLC SERPL DIRECT ASSAY-MCNC: 67 MG/DL (ref 0–99)
LYMPHOCYTES NFR BLD AUTO: 5.1 /CMM (ref 0.8–4.8)
LYMPHOCYTES NFR BLD AUTO: 52.7 % (ref 20–44)
MAGNESIUM SERPL-MCNC: 2 MG/DL (ref 1.8–2.4)
MCHC RBC AUTO-ENTMCNC: 33 G/DL (ref 31–36)
MCV RBC AUTO: 83 FL (ref 80–96)
MONOCYTES NFR BLD AUTO: 1.1 /CMM (ref 0.1–1.3)
MONOCYTES NFR BLD AUTO: 11.6 % (ref 2–12)
NEUTROPHILS # BLD AUTO: 3.4 /CMM (ref 1.8–8.9)
NEUTROPHILS NFR BLD AUTO: 35.3 % (ref 43–81)
PHOSPHATE SERPL-MCNC: 4.1 MG/DL (ref 2.5–4.9)
PLATELET # BLD AUTO: 145 /CMM (ref 150–450)
POTASSIUM SERPL-SCNC: 3.9 MMOL/L (ref 3.5–5.1)
PROT SERPL-MCNC: 6.1 G/DL (ref 6.4–8.2)
RBC # BLD AUTO: 4.92 MIL/UL (ref 4.5–6)
SODIUM SERPL-SCNC: 139 MMOL/L (ref 136–145)
TRIGL SERPL-MCNC: 87 MG/DL (ref 30–150)
TSH SERPL DL<=0.005 MIU/L-ACNC: 1.03 UIU/ML (ref 0.36–3.74)
WBC NRBC COR # BLD AUTO: 9.7 K/UL (ref 4.3–11)

## 2020-12-16 RX ADMIN — Medication SCH MG: at 10:08

## 2020-12-16 RX ADMIN — GABAPENTIN SCH MG: 100 CAPSULE ORAL at 05:25

## 2020-12-16 RX ADMIN — QUETIAPINE SCH MG: 25 TABLET, FILM COATED ORAL at 21:07

## 2020-12-16 RX ADMIN — GABAPENTIN SCH MG: 100 CAPSULE ORAL at 21:04

## 2020-12-16 RX ADMIN — DONEPEZIL HYDROCHLORIDE SCH MG: 5 TABLET ORAL at 21:04

## 2020-12-16 RX ADMIN — LOSARTAN POTASSIUM SCH MG: 50 TABLET, FILM COATED ORAL at 10:09

## 2020-12-16 RX ADMIN — CITALOPRAM SCH MG: 20 TABLET ORAL at 10:08

## 2020-12-16 RX ADMIN — ENOXAPARIN SODIUM SCH MG: 40 INJECTION SUBCUTANEOUS at 18:12

## 2020-12-16 RX ADMIN — THERA TABS SCH UDTAB: TAB at 10:13

## 2020-12-16 RX ADMIN — POLYETHYLENE GLYCOL 3350 SCH GM: 17 POWDER, FOR SOLUTION ORAL at 10:10

## 2020-12-16 RX ADMIN — ATORVASTATIN CALCIUM SCH MG: 10 TABLET, FILM COATED ORAL at 21:07

## 2020-12-16 RX ADMIN — Medication SCH UDTAB: at 10:11

## 2020-12-16 RX ADMIN — Medication SCH UDTAB: at 17:00

## 2020-12-16 RX ADMIN — PANTOPRAZOLE SODIUM SCH MG: 40 TABLET, DELAYED RELEASE ORAL at 10:10

## 2020-12-16 RX ADMIN — TAMSULOSIN HYDROCHLORIDE SCH MG: 0.4 CAPSULE ORAL at 21:04

## 2020-12-16 RX ADMIN — TERAZOSIN HYDROCHLORIDE ANHYDROUS SCH MG: 1 CAPSULE ORAL at 21:06

## 2020-12-16 RX ADMIN — Medication SCH UDTAB: at 18:08

## 2020-12-16 RX ADMIN — LIDOCAINE SCH EA: 50 PATCH CUTANEOUS at 09:00

## 2020-12-16 RX ADMIN — AMANTADINE HYDROCHLORIDE SCH MG: 100 CAPSULE, GELATIN COATED ORAL at 21:18

## 2020-12-16 RX ADMIN — GABAPENTIN SCH MG: 100 CAPSULE ORAL at 10:08

## 2020-12-16 RX ADMIN — Medication SCH MG: at 18:08

## 2020-12-16 RX ADMIN — SODIUM CHLORIDE PRN MLS/HR: 4.5 INJECTION, SOLUTION INTRAVENOUS at 21:02

## 2020-12-16 RX ADMIN — AMANTADINE HYDROCHLORIDE SCH MG: 100 CAPSULE, GELATIN COATED ORAL at 10:09

## 2020-12-16 RX ADMIN — Medication SCH UDTAB: at 21:04

## 2020-12-16 NOTE — NUR
Tele RN Opening note



Received pt in bed, a/o x2. Breathing even and unlabored in RA. 02 saturation 95%. No sob or 
acute distress noted. Sinus Rhythm of cardiac monitor. HR 70. Denies any pain or discomfort. 
Right AC #18 patent and intact. IV fluids infusing well. All needs rendered. Call light 
within reach. Bed in lowest position.Repositioned. Will continue to monitor

## 2020-12-16 NOTE — NUR
WOUND CARE CONSULT: REVIEWED CHART, NURSING DOCUMENTATION AND PHOTO WHICH INDICATES LEFT 
HEEL INTACT DEEP TISSUE INJURY, PRESENT ON ADMISSION. RECOMMENDATIONS MADE FOR SKIN 
PROTECTION. DISCUSSED WITH NURSING STAFF. MD IN AGREEMENT WITH PLAN OF CARE. PT IS ON 
[facility] ISOFLEX LOW AIRLOSS BED.

## 2020-12-16 NOTE — NUR
PAtient remains on room air ; saturation above 92%. IV line intact and patent and fluids 
running as ordered. PAtient kept clean and dry;comfortable at all times. Medication 
compliant ; meds given crushed ; diet changed to soft due to pt has no dentures. Endorsed to 
next shift for FLYNN

## 2020-12-17 VITALS — DIASTOLIC BLOOD PRESSURE: 64 MMHG | SYSTOLIC BLOOD PRESSURE: 141 MMHG

## 2020-12-17 VITALS — SYSTOLIC BLOOD PRESSURE: 154 MMHG | DIASTOLIC BLOOD PRESSURE: 73 MMHG

## 2020-12-17 VITALS — DIASTOLIC BLOOD PRESSURE: 65 MMHG | SYSTOLIC BLOOD PRESSURE: 145 MMHG

## 2020-12-17 VITALS — SYSTOLIC BLOOD PRESSURE: 142 MMHG | DIASTOLIC BLOOD PRESSURE: 70 MMHG

## 2020-12-17 VITALS — SYSTOLIC BLOOD PRESSURE: 162 MMHG | DIASTOLIC BLOOD PRESSURE: 72 MMHG

## 2020-12-17 VITALS — SYSTOLIC BLOOD PRESSURE: 166 MMHG | DIASTOLIC BLOOD PRESSURE: 70 MMHG

## 2020-12-17 LAB
BASOPHILS # BLD AUTO: 0 /CMM (ref 0–0.2)
BASOPHILS NFR BLD AUTO: 0.1 % (ref 0–2)
BUN SERPL-MCNC: 16 MG/DL (ref 7–18)
CALCIUM SERPL-MCNC: 8.3 MG/DL (ref 8.5–10.1)
CHLORIDE SERPL-SCNC: 103 MMOL/L (ref 98–107)
CO2 SERPL-SCNC: 24 MMOL/L (ref 21–32)
CREAT SERPL-MCNC: 0.6 MG/DL (ref 0.6–1.3)
EOSINOPHIL NFR BLD AUTO: 0.4 % (ref 0–6)
GLUCOSE SERPL-MCNC: 76 MG/DL (ref 74–106)
HCT VFR BLD AUTO: 41 % (ref 39–51)
HGB BLD-MCNC: 13.8 G/DL (ref 13.5–17.5)
LYMPHOCYTES NFR BLD AUTO: 4.6 /CMM (ref 0.8–4.8)
LYMPHOCYTES NFR BLD AUTO: 51.6 % (ref 20–44)
MCHC RBC AUTO-ENTMCNC: 33 G/DL (ref 31–36)
MCV RBC AUTO: 82 FL (ref 80–96)
MONOCYTES NFR BLD AUTO: 1.1 /CMM (ref 0.1–1.3)
MONOCYTES NFR BLD AUTO: 12.3 % (ref 2–12)
NEUTROPHILS # BLD AUTO: 3.2 /CMM (ref 1.8–8.9)
NEUTROPHILS NFR BLD AUTO: 35.6 % (ref 43–81)
PLATELET # BLD AUTO: 138 /CMM (ref 150–450)
POTASSIUM SERPL-SCNC: 3.9 MMOL/L (ref 3.5–5.1)
RBC # BLD AUTO: 5.05 MIL/UL (ref 4.5–6)
SODIUM SERPL-SCNC: 137 MMOL/L (ref 136–145)
WBC NRBC COR # BLD AUTO: 8.9 K/UL (ref 4.3–11)

## 2020-12-17 RX ADMIN — AMANTADINE HYDROCHLORIDE SCH MG: 100 CAPSULE, GELATIN COATED ORAL at 09:15

## 2020-12-17 RX ADMIN — Medication SCH UDTAB: at 17:53

## 2020-12-17 RX ADMIN — GABAPENTIN SCH MG: 100 CAPSULE ORAL at 13:00

## 2020-12-17 RX ADMIN — PANTOPRAZOLE SODIUM SCH MG: 40 TABLET, DELAYED RELEASE ORAL at 06:35

## 2020-12-17 RX ADMIN — LIDOCAINE SCH EA: 50 PATCH CUTANEOUS at 09:10

## 2020-12-17 RX ADMIN — CITALOPRAM SCH MG: 20 TABLET ORAL at 09:10

## 2020-12-17 RX ADMIN — Medication SCH MG: at 09:10

## 2020-12-17 RX ADMIN — Medication SCH MG: at 17:53

## 2020-12-17 RX ADMIN — Medication SCH UDTAB: at 21:50

## 2020-12-17 RX ADMIN — AMANTADINE HYDROCHLORIDE SCH MG: 100 CAPSULE, GELATIN COATED ORAL at 21:50

## 2020-12-17 RX ADMIN — TERAZOSIN HYDROCHLORIDE ANHYDROUS SCH MG: 1 CAPSULE ORAL at 21:51

## 2020-12-17 RX ADMIN — ATORVASTATIN CALCIUM SCH MG: 10 TABLET, FILM COATED ORAL at 21:51

## 2020-12-17 RX ADMIN — ACETAMINOPHEN PRN MG: 325 TABLET ORAL at 21:51

## 2020-12-17 RX ADMIN — TAMSULOSIN HYDROCHLORIDE SCH MG: 0.4 CAPSULE ORAL at 21:50

## 2020-12-17 RX ADMIN — SODIUM CHLORIDE PRN MLS/HR: 4.5 INJECTION, SOLUTION INTRAVENOUS at 17:52

## 2020-12-17 RX ADMIN — QUETIAPINE SCH MG: 25 TABLET, FILM COATED ORAL at 21:57

## 2020-12-17 RX ADMIN — POLYETHYLENE GLYCOL 3350 SCH GM: 17 POWDER, FOR SOLUTION ORAL at 09:10

## 2020-12-17 RX ADMIN — LOSARTAN POTASSIUM SCH MG: 50 TABLET, FILM COATED ORAL at 09:11

## 2020-12-17 RX ADMIN — DONEPEZIL HYDROCHLORIDE SCH MG: 5 TABLET ORAL at 21:52

## 2020-12-17 RX ADMIN — GABAPENTIN SCH MG: 100 CAPSULE ORAL at 21:57

## 2020-12-17 RX ADMIN — THERA TABS SCH UDTAB: TAB at 09:12

## 2020-12-17 RX ADMIN — ENOXAPARIN SODIUM SCH MG: 40 INJECTION SUBCUTANEOUS at 18:12

## 2020-12-17 RX ADMIN — Medication SCH UDTAB: at 13:00

## 2020-12-17 RX ADMIN — GABAPENTIN SCH MG: 100 CAPSULE ORAL at 04:29

## 2020-12-17 RX ADMIN — Medication SCH UDTAB: at 09:10

## 2020-12-17 NOTE — NUR
Tele RN Closing note



Pt in bed, asleep but easily arousable. Breathing even and unlabored in RA. o2 saturation is 
94%.Sinus Rhythm on cardiac monitor. HR 80. O2 saturation at 97%. Denies any pain or 
discomfort. Right AC IV site patent and intact. 0.45%NS at 50ml/hr fluids infusing well. All 
needs rendered. Repositioned q2h. Kept clean and dry. Call light within reach. Will endorse 
to am nurse for continuity of care.

## 2020-12-17 NOTE — NUR
CLARIFIED PORK ALLERGY; MEDICATIONS DERIVED FROM PORK OK

SPOKE WITH PATIENT DAUGHTER DOMINIQUE STATES PATIENT DOES NOT EAT PORK; ABSTAINS FOR [employer] 
REASONS FROM CONSUMPTION. ALLERGY ENTERED AS FOOD ALLERGY. STATES THAT MEDICATIONS DERIVED 
FROM PORK ARE OK IF MEDICALLY NECESSARY.

## 2020-12-17 NOTE — NUR
TELE/RN OPENING NOTES

RECEIVED PATIENT ON BED ALERT AND ORIENTED X1. PATIENT IN NO APPARENT RESPIRATORY DISTRESS 
NOTED. NO SIGN AND SYMPTOM OF PAIN AT THIS TIME. TELE MONITOR. IN PLACED READING SINUS 
RHYTHM 67 BPM. WILL CONTINUE TO MONITOR

## 2020-12-17 NOTE — NUR
SPOKE WITH DOMINIQUE SCOT UPDATED ON POC; PORK ALLERGY ADDED

STATES THAT PATIENT DOES NOT EAT PORK FOR [employer] REASONS. PORK ADDED TO ALLERGIES.

## 2020-12-17 NOTE — NUR
TELE/RN CLOSING NOTES

PATIENT IS ON BED ALERT AND ORIENTED X1. PATIENT IN NO APPARENT RESPIRATORY DISTRESS NOTED. 
NO COMPLAINED OF PAIN. NOTED AT THIS TIME. IV ACCESS AT RIGHT AC # 18G WITH IV FLUID OF 1/2 
NS 1L AT 50 ML/HR ON AND INFUSING WELL. TELE MONITOR IN PLACED SINUS TACHY 104 BPM. SEEN AND 
EXAMINED BY MD WITH ORDERS MADE AND CARRIED OUT. ALL DUE MEDICATIONS WAS GIVEN. SAFETY 
PRECAUTIONS WAS IN PLACED . SIDE RAILS UP X2. CALL LIGHT WITHIN REACH. WILL ENDORSED TO 
NIGHT SHIFT FOR FLYNN.

## 2020-12-18 VITALS — DIASTOLIC BLOOD PRESSURE: 57 MMHG | SYSTOLIC BLOOD PRESSURE: 117 MMHG

## 2020-12-18 VITALS — DIASTOLIC BLOOD PRESSURE: 70 MMHG | SYSTOLIC BLOOD PRESSURE: 155 MMHG

## 2020-12-18 VITALS — DIASTOLIC BLOOD PRESSURE: 52 MMHG | SYSTOLIC BLOOD PRESSURE: 124 MMHG

## 2020-12-18 VITALS — SYSTOLIC BLOOD PRESSURE: 155 MMHG | DIASTOLIC BLOOD PRESSURE: 70 MMHG

## 2020-12-18 LAB
BASOPHILS # BLD AUTO: 0 /CMM (ref 0–0.2)
BASOPHILS NFR BLD AUTO: 0.2 % (ref 0–2)
BUN SERPL-MCNC: 17 MG/DL (ref 7–18)
CALCIUM SERPL-MCNC: 8.3 MG/DL (ref 8.5–10.1)
CHLORIDE SERPL-SCNC: 102 MMOL/L (ref 98–107)
CO2 SERPL-SCNC: 25 MMOL/L (ref 21–32)
CREAT SERPL-MCNC: 0.6 MG/DL (ref 0.6–1.3)
EOSINOPHIL NFR BLD AUTO: 0.4 % (ref 0–6)
GLUCOSE SERPL-MCNC: 74 MG/DL (ref 74–106)
HCT VFR BLD AUTO: 41 % (ref 39–51)
HGB BLD-MCNC: 13.6 G/DL (ref 13.5–17.5)
LYMPHOCYTES NFR BLD AUTO: 59.1 % (ref 20–44)
LYMPHOCYTES NFR BLD AUTO: 6.1 /CMM (ref 0.8–4.8)
LYMPHOCYTES NFR BLD MANUAL: 64 % (ref 16–48)
MCHC RBC AUTO-ENTMCNC: 33 G/DL (ref 31–36)
MCV RBC AUTO: 82 FL (ref 80–96)
MONOCYTES NFR BLD AUTO: 1.1 /CMM (ref 0.1–1.3)
MONOCYTES NFR BLD AUTO: 10.3 % (ref 2–12)
MONOCYTES NFR BLD MANUAL: 5 % (ref 0–11)
NEUTROPHILS # BLD AUTO: 3.1 /CMM (ref 1.8–8.9)
NEUTROPHILS NFR BLD AUTO: 30 % (ref 43–81)
NEUTS SEG NFR BLD MANUAL: 31 % (ref 42–76)
PLATELET # BLD AUTO: 135 /CMM (ref 150–450)
POTASSIUM SERPL-SCNC: 3.8 MMOL/L (ref 3.5–5.1)
RBC # BLD AUTO: 4.98 MIL/UL (ref 4.5–6)
SODIUM SERPL-SCNC: 137 MMOL/L (ref 136–145)
WBC NRBC COR # BLD AUTO: 10.3 K/UL (ref 4.3–11)

## 2020-12-18 RX ADMIN — LIDOCAINE SCH EA: 50 PATCH CUTANEOUS at 09:03

## 2020-12-18 RX ADMIN — PANTOPRAZOLE SODIUM SCH MG: 40 TABLET, DELAYED RELEASE ORAL at 07:28

## 2020-12-18 RX ADMIN — AMANTADINE HYDROCHLORIDE SCH MG: 100 CAPSULE, GELATIN COATED ORAL at 09:01

## 2020-12-18 RX ADMIN — Medication SCH MG: at 09:01

## 2020-12-18 RX ADMIN — POLYETHYLENE GLYCOL 3350 SCH GM: 17 POWDER, FOR SOLUTION ORAL at 09:04

## 2020-12-18 RX ADMIN — THERA TABS SCH UDTAB: TAB at 09:02

## 2020-12-18 RX ADMIN — GABAPENTIN SCH MG: 100 CAPSULE ORAL at 12:52

## 2020-12-18 RX ADMIN — CITALOPRAM SCH MG: 20 TABLET ORAL at 09:02

## 2020-12-18 RX ADMIN — LOSARTAN POTASSIUM SCH MG: 50 TABLET, FILM COATED ORAL at 09:02

## 2020-12-18 RX ADMIN — Medication SCH UDTAB: at 12:52

## 2020-12-18 RX ADMIN — Medication SCH UDTAB: at 09:02

## 2020-12-18 RX ADMIN — GABAPENTIN SCH MG: 100 CAPSULE ORAL at 05:23

## 2020-12-18 NOTE — NUR
RN NOTES

PATIENT IS ALERT AND ORIENTED X1. NO SIGN AND SYMPTOM OF PAIN NOTED. PATIENT IN NO APPARENT 
RESPIRATORY DISTRESS NOTES. SEEN AND EXAMINED BY MD WITH ORDERS MADE AND CARRIED OUT. ALL 
DUE MEDICATIONS WAS GIVEN. REPORT WAS GIVEN TO RADHA LAMBERT AT [facility]. PATIENT 
LEFT IN MEDICALLY STABLE CONDITION.  BY 2 EMT VIA AMBULANCE.

## 2020-12-18 NOTE — NUR
TELE/RN OPENING NOTES

RECEIVED PATIENT ON BED ALERT AND ORIENTED X1. PATIENT IN NO APPARENT RESPIRATORY DISTRESS 
NOTED. NO SIGN AND SYMPTOM OF PAIN AT THIS TIME. TELE MONITOR IN PLACED READING SINUS 
RHYTHM, SINUS TACHY 61 BPM. WILL CONTINUE TO MONITOR.

## 2021-05-31 NOTE — NUR
Pharmacy Note  Warfarin Consult follow-up      Recent Labs     05/29/21  0505 05/30/21  0455 05/31/21  0513   INR 1.4 3.0 4.9*     Recent Labs     05/29/21  0505 05/30/21  0455 05/31/21  0513   HGB 8.6* 8.6* 8.3*   HCT 26.5* 26.4* 25.9*    181 184       Significant Drug-Drug Interactions:  New warfarin drug-drug interactions:   Discontinued drug-drug interactions:   Current warfarin drug-drug interactions: amiodarone, sertraline, prednisone, lipitor      Date             INR        Dose given previous day  Dose scheduled for today  5/31/2021        4.9     1 mg                                               none        Notes:                   INR is elevated at 4.9. Will not schedule any dose today. Continue daily PT/INR while inpatient. Pharmacy will monitor closely. Received patient up in wheelchair having his dinner inside room. No s/s of respiratory 
distress. Denied any pain/discomforts at this time, Safety measures  and fall prevention 
maintained. Seen and evaluated by Dr Nicole. Continue  care as planned.

## 2021-07-08 ENCOUNTER — HOSPITAL ENCOUNTER (EMERGENCY)
Dept: HOSPITAL 54 - ER | Age: 86
Discharge: HOSPICE | End: 2021-07-08
Payer: MEDICARE

## 2021-07-08 VITALS — HEIGHT: 67 IN | BODY MASS INDEX: 21.97 KG/M2 | WEIGHT: 140 LBS

## 2021-07-08 VITALS — DIASTOLIC BLOOD PRESSURE: 77 MMHG | SYSTOLIC BLOOD PRESSURE: 147 MMHG

## 2021-07-08 DIAGNOSIS — I10: ICD-10-CM

## 2021-07-08 DIAGNOSIS — F32.9: ICD-10-CM

## 2021-07-08 DIAGNOSIS — Z91.018: ICD-10-CM

## 2021-07-08 DIAGNOSIS — Z79.899: ICD-10-CM

## 2021-07-08 DIAGNOSIS — Z88.8: ICD-10-CM

## 2021-07-08 DIAGNOSIS — R56.9: Primary | ICD-10-CM

## 2021-07-08 DIAGNOSIS — M19.90: ICD-10-CM

## 2021-07-08 DIAGNOSIS — Z88.6: ICD-10-CM

## 2021-07-08 DIAGNOSIS — F03.90: ICD-10-CM

## 2021-07-08 DIAGNOSIS — K21.9: ICD-10-CM

## 2021-07-08 DIAGNOSIS — Z86.73: ICD-10-CM

## 2021-07-08 DIAGNOSIS — F25.9: ICD-10-CM

## 2021-07-08 DIAGNOSIS — E78.5: ICD-10-CM

## 2021-07-08 LAB
ALBUMIN SERPL BCP-MCNC: 4 G/DL (ref 3.4–5)
ALP SERPL-CCNC: 78 U/L (ref 46–116)
ALT SERPL W P-5'-P-CCNC: 10 U/L (ref 12–78)
AST SERPL W P-5'-P-CCNC: 19 U/L (ref 15–37)
BASOPHILS # BLD AUTO: 0.3 K/UL (ref 0–0.2)
BASOPHILS NFR BLD AUTO: 2.2 % (ref 0–2)
BILIRUB DIRECT SERPL-MCNC: 0.3 MG/DL (ref 0–0.2)
BILIRUB SERPL-MCNC: 0.8 MG/DL (ref 0.2–1)
BUN SERPL-MCNC: 24 MG/DL (ref 7–18)
CALCIUM SERPL-MCNC: 9.3 MG/DL (ref 8.5–10.1)
CHLORIDE SERPL-SCNC: 102 MMOL/L (ref 98–107)
CO2 SERPL-SCNC: 21 MMOL/L (ref 21–32)
CREAT SERPL-MCNC: 1.2 MG/DL (ref 0.6–1.3)
EOSINOPHIL NFR BLD AUTO: 0.3 % (ref 0–6)
GLUCOSE SERPL-MCNC: 163 MG/DL (ref 74–106)
HCT VFR BLD AUTO: 41 % (ref 39–51)
HGB BLD-MCNC: 13.4 G/DL (ref 13.5–17.5)
LYMPHOCYTES NFR BLD AUTO: 18.4 % (ref 20–44)
LYMPHOCYTES NFR BLD AUTO: 2.3 K/UL (ref 0.8–4.8)
MCHC RBC AUTO-ENTMCNC: 33 G/DL (ref 31–36)
MCV RBC AUTO: 87 FL (ref 80–96)
MONOCYTES NFR BLD AUTO: 0.7 K/UL (ref 0.1–1.3)
MONOCYTES NFR BLD AUTO: 5.9 % (ref 2–12)
NEUTROPHILS # BLD AUTO: 9.1 K/UL (ref 1.8–8.9)
NEUTROPHILS NFR BLD AUTO: 73.2 % (ref 43–81)
PLATELET # BLD AUTO: 169 K/UL (ref 150–450)
POTASSIUM SERPL-SCNC: 3.7 MMOL/L (ref 3.5–5.1)
PROT SERPL-MCNC: 7.2 G/DL (ref 6.4–8.2)
RBC # BLD AUTO: 4.67 MIL/UL (ref 4.5–6)
SODIUM SERPL-SCNC: 138 MMOL/L (ref 136–145)
WBC NRBC COR # BLD AUTO: 12.4 K/UL (ref 4.3–11)

## 2021-07-08 NOTE — NUR
PT [id number] FRM SNIF FOR NOTED "TONIC/CLONIC" SEIZURE LIKE ACTIVITY. 2 MINS. PT 
IS AAOX2, NOT IN RESPIRATORY DISTRESS, HOOKED TO CARDIAC MONITOR, KEPT RESTED 
AND COMFORTABLE. ON SEIZURE PREC. WILL CONTINUE TO MONITOR.